# Patient Record
Sex: MALE | Race: WHITE | NOT HISPANIC OR LATINO | Employment: FULL TIME | URBAN - METROPOLITAN AREA
[De-identification: names, ages, dates, MRNs, and addresses within clinical notes are randomized per-mention and may not be internally consistent; named-entity substitution may affect disease eponyms.]

---

## 2018-12-06 ENCOUNTER — APPOINTMENT (EMERGENCY)
Dept: RADIOLOGY | Facility: HOSPITAL | Age: 23
End: 2018-12-06
Payer: COMMERCIAL

## 2018-12-06 ENCOUNTER — HOSPITAL ENCOUNTER (EMERGENCY)
Facility: HOSPITAL | Age: 23
Discharge: HOME | End: 2018-12-06
Attending: EMERGENCY MEDICINE | Admitting: EMERGENCY MEDICINE
Payer: COMMERCIAL

## 2018-12-06 ENCOUNTER — OFFICE VISIT (OUTPATIENT)
Dept: URGENT CARE | Age: 23
End: 2018-12-06
Payer: COMMERCIAL

## 2018-12-06 VITALS
RESPIRATION RATE: 18 BRPM | OXYGEN SATURATION: 100 % | HEART RATE: 64 BPM | SYSTOLIC BLOOD PRESSURE: 133 MMHG | BODY MASS INDEX: 26.22 KG/M2 | WEIGHT: 188 LBS | DIASTOLIC BLOOD PRESSURE: 72 MMHG | TEMPERATURE: 98.7 F

## 2018-12-06 VITALS
TEMPERATURE: 99 F | HEART RATE: 78 BPM | OXYGEN SATURATION: 97 % | SYSTOLIC BLOOD PRESSURE: 139 MMHG | RESPIRATION RATE: 18 BRPM | BODY MASS INDEX: 26.32 KG/M2 | WEIGHT: 188 LBS | HEIGHT: 71 IN | DIASTOLIC BLOOD PRESSURE: 79 MMHG

## 2018-12-06 DIAGNOSIS — K52.9 GASTROENTERITIS: ICD-10-CM

## 2018-12-06 DIAGNOSIS — R11.2 NAUSEA VOMITING AND DIARRHEA: ICD-10-CM

## 2018-12-06 DIAGNOSIS — R10.33 PERIUMBILICAL ABDOMINAL PAIN: Primary | ICD-10-CM

## 2018-12-06 DIAGNOSIS — R19.7 DIARRHEA: Primary | ICD-10-CM

## 2018-12-06 DIAGNOSIS — R50.9 FEVER, UNSPECIFIED FEVER CAUSE: ICD-10-CM

## 2018-12-06 DIAGNOSIS — R10.9 ABDOMINAL PAIN: ICD-10-CM

## 2018-12-06 DIAGNOSIS — R19.7 NAUSEA VOMITING AND DIARRHEA: ICD-10-CM

## 2018-12-06 LAB
ALBUMIN SERPL BCP-MCNC: 3.9 G/DL (ref 3.5–5)
ALP SERPL-CCNC: 75 U/L (ref 46–116)
ALT SERPL W P-5'-P-CCNC: 27 U/L (ref 12–78)
ANION GAP SERPL CALCULATED.3IONS-SCNC: 9 MMOL/L (ref 4–13)
AST SERPL W P-5'-P-CCNC: 17 U/L (ref 5–45)
BASOPHILS # BLD AUTO: 0.02 THOUSANDS/ΜL (ref 0–0.1)
BASOPHILS NFR BLD AUTO: 0 % (ref 0–1)
BILIRUB SERPL-MCNC: 0.24 MG/DL (ref 0.2–1)
BUN SERPL-MCNC: 18 MG/DL (ref 5–25)
CALCIUM SERPL-MCNC: 9.2 MG/DL (ref 8.3–10.1)
CHLORIDE SERPL-SCNC: 106 MMOL/L (ref 100–108)
CO2 SERPL-SCNC: 26 MMOL/L (ref 21–32)
CREAT SERPL-MCNC: 0.9 MG/DL (ref 0.6–1.3)
EOSINOPHIL # BLD AUTO: 0.08 THOUSAND/ΜL (ref 0–0.61)
EOSINOPHIL NFR BLD AUTO: 2 % (ref 0–6)
ERYTHROCYTE [DISTWIDTH] IN BLOOD BY AUTOMATED COUNT: 11.7 % (ref 11.6–15.1)
GFR SERPL CREATININE-BSD FRML MDRD: 121 ML/MIN/1.73SQ M
GLUCOSE SERPL-MCNC: 89 MG/DL (ref 65–140)
HCT VFR BLD AUTO: 46.1 % (ref 36.5–49.3)
HGB BLD-MCNC: 16 G/DL (ref 12–17)
IMM GRANULOCYTES # BLD AUTO: 0.01 THOUSAND/UL (ref 0–0.2)
IMM GRANULOCYTES NFR BLD AUTO: 0 % (ref 0–2)
LIPASE SERPL-CCNC: 105 U/L (ref 73–393)
LYMPHOCYTES # BLD AUTO: 0.96 THOUSANDS/ΜL (ref 0.6–4.47)
LYMPHOCYTES NFR BLD AUTO: 21 % (ref 14–44)
MCH RBC QN AUTO: 30.7 PG (ref 26.8–34.3)
MCHC RBC AUTO-ENTMCNC: 34.7 G/DL (ref 31.4–37.4)
MCV RBC AUTO: 89 FL (ref 82–98)
MONOCYTES # BLD AUTO: 0.58 THOUSAND/ΜL (ref 0.17–1.22)
MONOCYTES NFR BLD AUTO: 13 % (ref 4–12)
NEUTROPHILS # BLD AUTO: 2.84 THOUSANDS/ΜL (ref 1.85–7.62)
NEUTS SEG NFR BLD AUTO: 64 % (ref 43–75)
NRBC BLD AUTO-RTO: 0 /100 WBCS
PLATELET # BLD AUTO: 175 THOUSANDS/UL (ref 149–390)
PMV BLD AUTO: 9.4 FL (ref 8.9–12.7)
POTASSIUM SERPL-SCNC: 3.8 MMOL/L (ref 3.5–5.3)
PROT SERPL-MCNC: 7.8 G/DL (ref 6.4–8.2)
RBC # BLD AUTO: 5.21 MILLION/UL (ref 3.88–5.62)
SODIUM SERPL-SCNC: 141 MMOL/L (ref 136–145)
WBC # BLD AUTO: 4.49 THOUSAND/UL (ref 4.31–10.16)

## 2018-12-06 PROCEDURE — 96361 HYDRATE IV INFUSION ADD-ON: CPT

## 2018-12-06 PROCEDURE — 83690 ASSAY OF LIPASE: CPT | Performed by: EMERGENCY MEDICINE

## 2018-12-06 PROCEDURE — 80053 COMPREHEN METABOLIC PANEL: CPT | Performed by: EMERGENCY MEDICINE

## 2018-12-06 PROCEDURE — 96374 THER/PROPH/DIAG INJ IV PUSH: CPT

## 2018-12-06 PROCEDURE — 74177 CT ABD & PELVIS W/CONTRAST: CPT

## 2018-12-06 PROCEDURE — G0382 LEV 3 HOSP TYPE B ED VISIT: HCPCS | Performed by: FAMILY MEDICINE

## 2018-12-06 PROCEDURE — 36415 COLL VENOUS BLD VENIPUNCTURE: CPT | Performed by: EMERGENCY MEDICINE

## 2018-12-06 PROCEDURE — 99284 EMERGENCY DEPT VISIT MOD MDM: CPT

## 2018-12-06 PROCEDURE — 85025 COMPLETE CBC W/AUTO DIFF WBC: CPT | Performed by: EMERGENCY MEDICINE

## 2018-12-06 RX ORDER — CARBAMAZEPINE 100 MG/5ML
SUSPENSION ORAL 4 TIMES DAILY
COMMUNITY
End: 2021-09-27 | Stop reason: HOSPADM

## 2018-12-06 RX ORDER — DICYCLOMINE HCL 20 MG
20 TABLET ORAL ONCE
Status: COMPLETED | OUTPATIENT
Start: 2018-12-06 | End: 2018-12-06

## 2018-12-06 RX ORDER — DICYCLOMINE HCL 20 MG
20 TABLET ORAL 2 TIMES DAILY
Qty: 10 TABLET | Refills: 0 | Status: SHIPPED | OUTPATIENT
Start: 2018-12-06 | End: 2018-12-13

## 2018-12-06 RX ORDER — ONDANSETRON 2 MG/ML
4 INJECTION INTRAMUSCULAR; INTRAVENOUS ONCE
Status: COMPLETED | OUTPATIENT
Start: 2018-12-06 | End: 2018-12-06

## 2018-12-06 RX ADMIN — SODIUM CHLORIDE 1000 ML: 0.9 INJECTION, SOLUTION INTRAVENOUS at 18:51

## 2018-12-06 RX ADMIN — ONDANSETRON 4 MG: 2 INJECTION INTRAMUSCULAR; INTRAVENOUS at 18:53

## 2018-12-06 RX ADMIN — IOHEXOL 100 ML: 350 INJECTION, SOLUTION INTRAVENOUS at 20:09

## 2018-12-06 RX ADMIN — DICYCLOMINE HYDROCHLORIDE 20 MG: 20 TABLET ORAL at 18:53

## 2018-12-06 NOTE — PATIENT INSTRUCTIONS
Please go to the [facility] Emergency Department now for further evaluation and treatment- hospital address verified with the patient  Patient agreed to go immediately to the ED

## 2018-12-06 NOTE — ED PROVIDER NOTES
History  Chief Complaint   Patient presents with    Abdominal Pain     fever, vomiting, abd pain and diarrhea sent from [facility]  56-year-old male who presents to the ED for evaluation of abdominal pain  Patient's symptoms began 2 days ago with nausea vomiting nonbloody nonbilious, as well as nonbloody diarrhea  He states that he has had loose stools approximately 15 times yesterday and 3 times today  Has taken Advil for some pain relief, but otherwise has not taken anything else for symptoms  States that his abdominal pain is periumbilical in location, constant, intermittently getting worse  On ROS, stating he did have a fever prior to arrival of 100 7  Denies any other relevant past medical history  Patient is concerned because 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 Taking? Lurasidone HCl (LATUDA PO)   Yes Yes   Sig: Take by mouth   carBAMazepine (TEGretol) 100 mg/5 mL suspension   Yes No   Sig: Take by mouth 4 (four) times a day      Facility-Administered Medications: None       Past Medical History:   Diagnosis Date    Bipolar 1 disorder ([facility] [address] )     Depression        History reviewed  No pertinent surgical history  History reviewed  No pertinent family history  I have reviewed and agree with the history as documented  Social History   Substance Use Topics    Smoking status: Never Smoker    Smokeless tobacco: Never Used    Alcohol use No        Review of Systems   Constitutional: Negative for chills, fatigue and fever  HENT: Negative for nosebleeds and sore throat  Eyes: Negative for redness and visual disturbance  Respiratory: Negative for shortness of breath and wheezing  Cardiovascular: Negative for chest pain and palpitations  Gastrointestinal: Positive for abdominal pain, nausea and vomiting   Negative for abdominal distention, anal bleeding, blood in stool, constipation, diarrhea and rectal pain  Endocrine: Negative for polyuria  Genitourinary: Negative for difficulty urinating and testicular pain  Musculoskeletal: Negative for back pain and neck stiffness  Skin: Negative for rash and wound  Neurological: Negative for seizures, speech difficulty and headaches  Psychiatric/Behavioral: Negative for dysphoric mood and hallucinations  All other systems reviewed and are negative  Physical Exam  ED Triage Vitals   Temperature Pulse Respirations Blood Pressure SpO2   12/06/18 1804 12/06/18 1804 12/06/18 1804 12/06/18 1804 12/06/18 1804   98 7 °F (37 1 °C) 67 16 139/65 100 %      Temp Source Heart Rate Source Patient Position - Orthostatic VS BP Location FiO2 (%)   12/06/18 1804 12/06/18 1900 12/06/18 1900 12/06/18 1900 --   Oral Monitor Sitting Right arm       Pain Score       12/06/18 1804       6           Orthostatic Vital Signs  Vitals:    12/06/18 1804 12/06/18 1900 12/06/18 2000 12/06/18 2045   BP: 139/65 135/80 153/85 133/72   Pulse: 67 68 62 64   Patient Position - Orthostatic VS:  Sitting Sitting Sitting       Physical Exam   Constitutional: He is oriented to person, place, and time  He appears well-developed and well-nourished  HENT:   Head: Normocephalic and atraumatic  Right Ear: External ear normal    Left Ear: External ear normal    Mouth/Throat: Oropharynx is clear and moist    Eyes: Pupils are equal, round, and reactive to light  Conjunctivae and EOM are normal    Neck: Normal range of motion  Cardiovascular: Normal rate, regular rhythm, normal heart sounds and intact distal pulses  Pulmonary/Chest: Effort normal and breath sounds normal  He has no wheezes  He exhibits no tenderness  Abdominal: Bowel sounds are normal  There is tenderness  There is guarding  Periumbilical abdominal pain on palpation   Musculoskeletal: Normal range of motion     Neurological: He is alert and oriented to person, place, and time  No cranial nerve deficit or sensory deficit  He exhibits normal muscle tone  Coordination normal    Skin: Skin is warm and dry  No rash noted  Psychiatric: He has a normal mood and affect  Nursing note and vitals reviewed  ED Medications  Medications   sodium chloride 0 9 % bolus 1,000 mL (0 mL Intravenous Stopped 12/6/18 1951)   ondansetron (ZOFRAN) injection 4 mg (4 mg Intravenous Given 12/6/18 1853)   dicyclomine (BENTYL) tablet 20 mg (20 mg Oral Given 12/6/18 1853)   iohexol (OMNIPAQUE) 350 MG/ML injection (MULTI-DOSE) 100 mL (100 mL Intravenous Given 12/6/18 2009)       Diagnostic Studies  Results Reviewed     Procedure Component Value Units Date/Time    Comprehensive metabolic panel [[id number]] Collected:  12/06/18 1851    Lab Status:  Final result Specimen:  Blood from Hand, Right Updated:  12/06/18 1956     Sodium 141 mmol/L      Potassium 3 8 mmol/L      Chloride 106 mmol/L      CO2 26 mmol/L      ANION GAP 9 mmol/L      BUN 18 mg/dL      Creatinine 0 90 mg/dL      Glucose 89 mg/dL      Calcium 9 2 mg/dL      AST 17 U/L      ALT 27 U/L      Alkaline Phosphatase 75 U/L      Total Protein 7 8 g/dL      Albumin 3 9 g/dL      Total Bilirubin 0 24 mg/dL      eGFR 121 ml/min/1 73sq m     Narrative:         National Kidney Disease Education Program recommendations are as follows:  GFR calculation is accurate only with a steady state creatinine  Chronic Kidney disease less than 60 ml/min/1 73 sq  meters  Kidney failure less than 15 ml/min/1 73 sq  meters      Lipase [[id number]]  (Normal) Collected:  12/06/18 1851    Lab Status:  Final result Specimen:  Blood from Hand, Right Updated:  12/06/18 1956     Lipase 105 u/L     CBC and differential [[id number]]  (Abnormal) Collected:  12/06/18 1851    Lab Status:  Final result Specimen:  Blood from Hand, Right Updated:  12/06/18 1939     WBC 4 49 Thousand/uL      RBC 5 21 Million/uL      Hemoglobin 16 0 g/dL      Hematocrit 46 1 %      MCV 89 fL      MCH 30 7 pg      MCHC 34 7 g/dL      RDW 11 7 %      MPV 9 4 fL      Platelets 535 Thousands/uL      nRBC 0 /100 WBCs      Neutrophils Relative 64 %      Immat GRANS % 0 %      Lymphocytes Relative 21 %      Monocytes Relative 13 (H) %      Eosinophils Relative 2 %      Basophils Relative 0 %      Neutrophils Absolute 2 84 Thousands/µL      Immature Grans Absolute 0 01 Thousand/uL      Lymphocytes Absolute 0 96 Thousands/µL      Monocytes Absolute 0 58 Thousand/µL      Eosinophils Absolute 0 08 Thousand/µL      Basophils Absolute 0 02 Thousands/µL                  CT abdomen pelvis w contrast   Final Result by Sobeida Riggins MD (12/06 2018)      There is a small amount of free fluid in the pelvis which is abnormal in a male patient, however, the etiology is uncertain  A normal appendix is visible  No acute localized pathology seen  Workstation performed: [id number]               Procedures  Procedures      Phone Consults  ED Phone Contact    ED Course  ED Course as of Dec 07 0158   Thu Dec 06, 2018   1958 Creatinine: 0 90           MDM  CritCare Time    70-year-old male presents to ED for evaluation of abdominal pain  Given periumbilical abdominal pain, as well as profuse nausea vomiting and diarrhea, will check CBC  CMP lipase, as well as CT abd pelvis given concern for abdominal pathology  Labs unremarkable  CT abd pelvis shows normal appendix  Discharged with bentyl for comfort  The patient was instructed to follow up as documented  Strict return precautions were discussed with the patient and the patient was instructed to return to the emergency department immediately if symptoms worsen  The patient/patient family member acknowledged and were in agreement with plan         Disposition  Final diagnoses:   Diarrhea   Gastroenteritis   Abdominal pain     Time reflects when diagnosis was documented in both MDM as applicable and the Disposition within this note     Time User Action Codes Description Comment    12/6/2018  8:45 PM Shareeyanet Taz Add [R19 7] Diarrhea     12/6/2018  8:46 PM Tad Mcghee Add [K52 9] Gastroenteritis     12/6/2018  8:48 PM Tad Mcghee Add [R10 9] Abdominal pain       ED Disposition     ED Disposition Condition Comment    Discharge  Helen Sp discharge to home/self care  Condition at discharge: Good        Follow-up Information     Follow up With Specialties Details Why Contact Info Additional Information    Claudell Jericho, MD Family Medicine Schedule an appointment as soon as possible for a visit in 3 days For follow up regarding your symptoms [address] [facility] [phone] [facility]       [address] [place] [address] Emergency Department Emergency Medicine Go to If symptoms worsen [address] [facility]  [phone]  ED, [address] [place] [address], [address]          Discharge Medication List as of 12/6/2018  8:48 PM      START taking these medications    Details   dicyclomine (BENTYL) 20 mg tablet Take 1 tablet (20 mg total) by mouth 2 (two) times a day for 7 days, Starting Thu 12/6/2018, Until Thu 12/13/2018, Print         CONTINUE these medications which have NOT CHANGED    Details   carBAMazepine (TEGretol) 100 mg/5 mL suspension Take by mouth 4 (four) times a day, Historical Med      Lurasidone HCl (LATUDA PO) Take by mouth, Historical Med           No discharge procedures on file  ED Provider  Attending physically available and evaluated Helen Snowden I managed the patient along with the ED Attending      Electronically Signed by         Karin Chaparro MD  12/07/18 1515

## 2018-12-06 NOTE — PROGRESS NOTES
[facility] [employer] [facility]        NAME: Janna Rich is a 26 y o  male  : 1995    MRN: [id number]  DATE: 2018  TIME: 5:02 PM    Assessment and Plan   Periumbilical abdominal pain [R10 33]  1  Periumbilical abdominal pain  Transfer to other facility   2  Fever, unspecified fever cause  Transfer to other facility   3  Nausea and vomiting, intractability of vomiting not specified, unspecified vomiting type  Transfer to other facility         Patient Instructions     Please go to the [facility] Emergency Department now for further evaluation and treatment- hospital address verified with the patient- patient given directions  Patient agreed to go immediately to the ED  Chief Complaint     Chief Complaint   Patient presents with    Abdominal Pain     intermittent dull ache , epigastric pain x 2 days,with vomiting , and diarrhea  History of Present Illness       79-year-old male presents with abdominal pain, fevers, vomiting, nausea and diarrhea since Monday  States he has had between 10-15 episodes of loose, watery diarrhea daily  States he has vomited about 5 times daily  States he feels like his diarrhea and vomiting are slightly improved from Monday but still present multiple times throughout the day  Denies any blood in stool or vomit  States his abdominal pain is mostly around his belly button and feels it is worsening  Describes it as a 6/10 that originally went away after bowel movements but now has become more constant  States his fevers got as high as 101 7  He has been taking Tylenol and Motrin  He denies any bad foods, new restaurants, recent travel or antibiotic use  Denies any sick contacts  He feels like he is still holding down some fluids as he is still making urine but he does feel slightly dehydrated  Review of Systems   Review of Systems   Constitutional: Positive for fatigue and fever     HENT: Negative for ear pain, rhinorrhea, sore throat and trouble swallowing  Eyes: Negative for itching and visual disturbance  Respiratory: Negative for cough, shortness of breath and wheezing  Cardiovascular: Negative for chest pain and leg swelling  Gastrointestinal: Positive for abdominal pain, diarrhea, nausea and vomiting  Negative for blood in stool  Musculoskeletal: Negative for back pain, joint swelling, neck pain and neck stiffness  Skin: Negative for rash  Neurological: Negative for dizziness, seizures, weakness, numbness and headaches  All other systems reviewed and are negative  Current Medications       Current Outpatient Prescriptions:     carBAMazepine (TEGretol) 100 mg/5 mL suspension, Take by mouth 4 (four) times a day, Disp: , Rfl:     Current Allergies     Allergies as of 12/06/2018    (No Known Allergies)            The following portions of the patient's history were reviewed and updated as appropriate: allergies, current medications, past family history, past medical history, past social history, past surgical history and problem list      Past Medical History:   Diagnosis Date    Bipolar 1 disorder ([facility] [address] )     Depression        History reviewed  No pertinent surgical history  No family history on file  Medications have been verified  Objective   /79   Pulse 78   Temp 99 °F (37 2 °C) (Temporal)   Resp 18   Ht 5' 11" (1 803 m)   Wt 85 3 kg (188 lb)   SpO2 97%   BMI 26 22 kg/m²        Physical Exam     Physical Exam   Constitutional: He is oriented to person, place, and time  He appears well-developed and well-nourished  Mildly uncomfortable, holding his stomach, appears tired   HENT:   Head: Normocephalic and atraumatic  Mildly dry mucous membranes    Eyes: Pupils are equal, round, and reactive to light  Conjunctivae are normal    Neck: Normal range of motion  Neck supple  Cardiovascular: Normal rate, regular rhythm and normal heart sounds      Pulmonary/Chest: Effort normal and breath sounds normal  He has no wheezes  Abdominal: Soft  Bowel sounds are normal  There is tenderness in the right lower quadrant, periumbilical area and left lower quadrant  There is no rebound  Musculoskeletal: Normal range of motion  Neurological: He is alert and oriented to person, place, and time  Skin: Skin is warm and dry  Psychiatric: He has a normal mood and affect  His behavior is normal    Nursing note and vitals reviewed  Patient with multiple episodes of diarrhea and vomiting since Monday  Patient having fevers  Patient is tender on abdominal exam    Needs to go to the emergency department for further evaluation and treatment

## 2018-12-06 NOTE — ED ATTENDING ATTESTATION
Nael Pride MD, saw and evaluated the patient  I have discussed the patient with the resident/non-physician practitioner and agree with the resident's/non-physician practitioner's findings, Plan of Care, and MDM as documented in the resident's/non-physician practitioner's note, except where noted  All available labs and Radiology studies were reviewed  At this point I agree with the current assessment done in the Emergency Department  I have conducted an independent evaluation of this patient a history and physical is as follows:      Critical Care Time  CritCare Time    Procedures     25 yo male c/o periumbilical abdominal pain for two days, fever up to 100 7, nausea, vomiting x 3 since yesterday  Pt with diarrhea multiple times  Taking advil  Pt went to urgent care sent in for further evaluation  pmh bipolar  Vss, afebrile, lungs cta, rrr, abdomen soft diffuse tenderness  Labs, ivf, ct a/p, zofran, bentyl

## 2018-12-07 NOTE — DISCHARGE INSTRUCTIONS

## 2019-09-13 ENCOUNTER — HOSPITAL ENCOUNTER (EMERGENCY)
Facility: HOSPITAL | Age: 24
Discharge: HOME | End: 2019-09-13
Attending: EMERGENCY MEDICINE | Admitting: EMERGENCY MEDICINE
Payer: COMMERCIAL

## 2019-09-13 ENCOUNTER — TRANSCRIBE ORDERS (OUTPATIENT)
Dept: LAB | Facility: HOSPITAL | Age: 24
End: 2019-09-13

## 2019-09-13 VITALS
HEIGHT: 71 IN | DIASTOLIC BLOOD PRESSURE: 91 MMHG | OXYGEN SATURATION: 100 % | RESPIRATION RATE: 18 BRPM | HEART RATE: 67 BPM | WEIGHT: 200 LBS | TEMPERATURE: 98.4 F | BODY MASS INDEX: 28 KG/M2 | SYSTOLIC BLOOD PRESSURE: 160 MMHG

## 2019-09-13 DIAGNOSIS — R30.0 DYSURIA: ICD-10-CM

## 2019-09-13 DIAGNOSIS — Z20.2 EXPOSURE TO STD: Primary | ICD-10-CM

## 2019-09-13 LAB
BACTERIA UR QL AUTO: ABNORMAL /HPF
BILIRUB UR QL STRIP: NEGATIVE
CLARITY UR: CLEAR
COLOR UR: YELLOW
COLOR, POC: NORMAL
GLUCOSE UR STRIP-MCNC: NEGATIVE MG/DL
HGB UR QL STRIP.AUTO: NEGATIVE
HYALINE CASTS #/AREA URNS LPF: ABNORMAL /LPF
KETONES UR STRIP-MCNC: NEGATIVE MG/DL
LEUKOCYTE ESTERASE UR QL STRIP: ABNORMAL
NITRITE UR QL STRIP: NEGATIVE
NON-SQ EPI CELLS URNS QL MICRO: ABNORMAL /HPF
PH UR STRIP.AUTO: 7 [PH] (ref 4.5–8)
PROT UR STRIP-MCNC: ABNORMAL MG/DL
RBC #/AREA URNS AUTO: ABNORMAL /HPF
SP GR UR STRIP.AUTO: 1.02 (ref 1–1.03)
UROBILINOGEN UR QL STRIP.AUTO: 0.2 E.U./DL
WBC #/AREA URNS AUTO: ABNORMAL /HPF

## 2019-09-13 PROCEDURE — 87086 URINE CULTURE/COLONY COUNT: CPT

## 2019-09-13 PROCEDURE — 96372 THER/PROPH/DIAG INJ SC/IM: CPT

## 2019-09-13 PROCEDURE — 87591 N.GONORRHOEAE DNA AMP PROB: CPT | Performed by: PHYSICIAN ASSISTANT

## 2019-09-13 PROCEDURE — 99283 EMERGENCY DEPT VISIT LOW MDM: CPT | Performed by: PHYSICIAN ASSISTANT

## 2019-09-13 PROCEDURE — 81001 URINALYSIS AUTO W/SCOPE: CPT

## 2019-09-13 PROCEDURE — 87491 CHLMYD TRACH DNA AMP PROBE: CPT | Performed by: PHYSICIAN ASSISTANT

## 2019-09-13 PROCEDURE — 99283 EMERGENCY DEPT VISIT LOW MDM: CPT

## 2019-09-13 RX ORDER — ONDANSETRON 4 MG/1
4 TABLET, ORALLY DISINTEGRATING ORAL ONCE
Status: DISCONTINUED | OUTPATIENT
Start: 2019-09-13 | End: 2019-09-13

## 2019-09-13 RX ORDER — AZITHROMYCIN 250 MG/1
1000 TABLET, FILM COATED ORAL ONCE
Status: COMPLETED | OUTPATIENT
Start: 2019-09-13 | End: 2019-09-13

## 2019-09-13 RX ADMIN — WATER 250 MG: 1 INJECTION INTRAMUSCULAR; INTRAVENOUS; SUBCUTANEOUS at 10:34

## 2019-09-13 RX ADMIN — AZITHROMYCIN 1000 MG: 250 TABLET, FILM COATED ORAL at 10:33

## 2019-09-13 NOTE — ED PROVIDER NOTES
History  Chief Complaint   Patient presents with    Groin Pain     Pt states seen at [facility] a few weeks ago and treated for STD  Pt states pain returned 4 days ago     80-year-old male with history of Chlamydia, diagnosed on 8/8/2019, presents for evaluation of dysuria and penile pain for the past few days  Patient reports that he feels as he did approximately 1 month ago and was diagnosed with chlamydia  He denies any penile discharge but does admit to dysuria  Denies any hematuria, urinary frequency, urgency, abdominal pain, back pain, fever, chills, nausea, vomiting, constipation or diarrhea  Does report having intercourse with the same female partner  Denies getting rechecked after being treated for Chlamydia the 1st time  Did have his partner treated  Denies any rashes or lesions over the groin, or buldges  Prior to Admission Medications   Prescriptions Last Dose Informant Patient Reported? Taking? Lurasidone HCl (LATUDA PO)   Yes No   Sig: Take by mouth   carBAMazepine (TEGretol) 100 mg/5 mL suspension   Yes No   Sig: Take by mouth 4 (four) times a day   dicyclomine (BENTYL) 20 mg tablet   No No   Sig: Take 1 tablet (20 mg total) by mouth 2 (two) times a day for 7 days   lamoTRIgine ER (LAMICTAL XR) 250 MG TB24   Yes No   Sig: Take by mouth   sertraline (ZOLOFT) 25 mg tablet   Yes No   Sig: Take by mouth   ziprasidone (GEODON) 40 mg capsule   Yes No   Sig: Take by mouth      Facility-Administered Medications: None       Past Medical History:   Diagnosis Date    Bipolar 1 disorder ([facility] [address] )     Depression        History reviewed  No pertinent surgical history  History reviewed  No pertinent family history  I have reviewed and agree with the history as documented      Social History     Tobacco Use    Smoking status: Never Smoker    Smokeless tobacco: Never Used   Substance Use Topics    Alcohol use: Yes     Comment: social    Drug use: Yes     Types: Marijuana        Review of Systems   Constitutional: Negative for chills and fever  Gastrointestinal: Negative for abdominal pain, constipation, diarrhea, nausea and vomiting  Genitourinary: Positive for dysuria and penile pain  Negative for discharge, flank pain, frequency, genital sores, hematuria, penile swelling, scrotal swelling and testicular pain  Physical Exam  Physical Exam   Constitutional: He appears well-developed and well-nourished  No distress  Abdominal: Hernia confirmed negative in the right inguinal area and confirmed negative in the left inguinal area  Genitourinary: Testes normal and penis normal  Right testis shows no mass, no swelling and no tenderness  Right testis is descended  Left testis shows no mass, no swelling and no tenderness  Left testis is descended  Circumcised  No phimosis, paraphimosis, hypospadias, penile erythema or penile tenderness  No discharge found  Genitourinary Comments: ED male nurse present during exam  No rashes, lesions, or ulcerations noted  No discharge from penis  No ttp over penis or scrotum  No swelling noted  Skin: He is not diaphoretic  Vitals reviewed        Vital Signs  ED Triage Vitals [09/13/19 0943]   Temperature Pulse Respirations Blood Pressure SpO2   98 4 °F (36 9 °C) 67 18 160/91 100 %      Temp Source Heart Rate Source Patient Position - Orthostatic VS BP Location FiO2 (%)   Oral Monitor Sitting Left arm --      Pain Score       6           Vitals:    09/13/19 0943   BP: 160/91   Pulse: 67   Patient Position - Orthostatic VS: Sitting         Visual Acuity      ED Medications  Medications   azithromycin (ZITHROMAX) tablet 1,000 mg (1,000 mg Oral Given 9/13/19 1033)   cefTRIAXone (ROCEPHIN) 250 mg in sterile water IM only syringe (250 mg Intramuscular Given 9/13/19 1034)       Diagnostic Studies  Results Reviewed     Procedure Component Value Units Date/Time    POCT urinalysis dipstick [[id number]]  (Normal) Resulted:  09/13/19 1103    Lab Status:  Final result Specimen:  Urine Updated:  09/13/19 1103     Color, UA -    Urine Microscopic [[id number]] Collected:  09/13/19 1103    Lab Status:  No result Specimen:  Urine, Other     ED Urine Macroscopic [[id number]]  (Abnormal) Collected:  09/13/19 1103    Lab Status:  Final result Specimen:  Urine Updated:  09/13/19 1102     Color, UA Yellow     Clarity, UA Clear     pH, UA 7 0     Leukocytes, UA Trace     Nitrite, UA Negative     Protein, UA 30 (1+) mg/dl      Glucose, UA Negative mg/dl      Ketones, UA Negative mg/dl      Urobilinogen, UA 0 2 E U /dl      Bilirubin, UA Negative     Blood, UA Negative     Specific Gravity, UA 1 025    Narrative:       CLINITEK RESULT    Chlamydia/GC amplified DNA by PCR [[id number]] Collected:  09/13/19 1032    Lab Status: In process Specimen:  Urine, Other Updated:  09/13/19 1035                 No orders to display              Procedures  Procedures       ED Course                               MDM  Number of Diagnoses or Management Options  Diagnosis management comments: 69-year-old male presents for evaluation of dysuria and penile pain  He is well-appearing, vitals stable  Will treat patient empirically for Chlamydia and gonorrhea  Will check urine possible urinary tract infection  Disposition  Final diagnoses:   Exposure to STD   Dysuria     Time reflects when diagnosis was documented in both MDM as applicable and the Disposition within this note     Time User Action Codes Description Comment    9/13/2019 11:07 AM Melissa Machado Add [Z20 2] Exposure to STD     9/13/2019 11:07 AM Melissa Machado Add [R30 0] Dysuria       ED Disposition     ED Disposition Condition Date/Time Comment    Discharge Stable Fri Sep 13, 2019 11:07 AM Kelly Kelley discharge to home/self care              Follow-up Information     Follow up With Specialties Details Why [facility] Emergency Department Emergency Medicine   [address] [place] [address] [address]  [phone]  ED, [address] [address], [place], [place], [address]          Patient's Medications   Discharge Prescriptions    No medications on file     No discharge procedures on file      ED Provider  Electronically Signed by           Theodore De La Paz PA-C  09/13/19 110

## 2019-09-14 LAB — BACTERIA UR CULT: NORMAL

## 2019-09-16 LAB
C TRACH DNA SPEC QL NAA+PROBE: POSITIVE
N GONORRHOEA DNA SPEC QL NAA+PROBE: NEGATIVE

## 2019-10-09 ENCOUNTER — OFFICE VISIT (OUTPATIENT)
Dept: URGENT CARE | Age: 24
End: 2019-10-09
Payer: COMMERCIAL

## 2019-10-09 VITALS
OXYGEN SATURATION: 98 % | TEMPERATURE: 96.8 F | RESPIRATION RATE: 20 BRPM | HEART RATE: 87 BPM | DIASTOLIC BLOOD PRESSURE: 64 MMHG | SYSTOLIC BLOOD PRESSURE: 122 MMHG

## 2019-10-09 DIAGNOSIS — R30.0 DYSURIA: Primary | ICD-10-CM

## 2019-10-09 LAB
SL AMB  POCT GLUCOSE, UA: NEGATIVE
SL AMB LEUKOCYTE ESTERASE,UA: NEGATIVE
SL AMB POCT BILIRUBIN,UA: NEGATIVE
SL AMB POCT BLOOD,UA: NEGATIVE
SL AMB POCT CLARITY,UA: CLEAR
SL AMB POCT COLOR,UA: YELLOW
SL AMB POCT KETONES,UA: NEGATIVE
SL AMB POCT NITRITE,UA: NEGATIVE
SL AMB POCT PH,UA: 7.5
SL AMB POCT SPECIFIC GRAVITY,UA: 1.01
SL AMB POCT URINE PROTEIN: 7.5
SL AMB POCT UROBILINOGEN: 0.2

## 2019-10-09 PROCEDURE — 87591 N.GONORRHOEAE DNA AMP PROB: CPT | Performed by: PHYSICIAN ASSISTANT

## 2019-10-09 PROCEDURE — 87491 CHLMYD TRACH DNA AMP PROBE: CPT | Performed by: PHYSICIAN ASSISTANT

## 2019-10-09 PROCEDURE — 81002 URINALYSIS NONAUTO W/O SCOPE: CPT | Performed by: PHYSICIAN ASSISTANT

## 2019-10-09 PROCEDURE — 96372 THER/PROPH/DIAG INJ SC/IM: CPT | Performed by: PHYSICIAN ASSISTANT

## 2019-10-09 PROCEDURE — 99203 OFFICE O/P NEW LOW 30 MIN: CPT | Performed by: PHYSICIAN ASSISTANT

## 2019-10-09 PROCEDURE — 87086 URINE CULTURE/COLONY COUNT: CPT | Performed by: PHYSICIAN ASSISTANT

## 2019-10-09 RX ORDER — DOXYCYCLINE HYCLATE 100 MG/1
100 CAPSULE ORAL EVERY 12 HOURS SCHEDULED
Qty: 14 CAPSULE | Refills: 0 | Status: SHIPPED | OUTPATIENT
Start: 2019-10-09 | End: 2019-10-16

## 2019-10-09 RX ORDER — CEFTRIAXONE SODIUM 250 MG/1
250 INJECTION, POWDER, FOR SOLUTION INTRAMUSCULAR; INTRAVENOUS ONCE
Status: COMPLETED | OUTPATIENT
Start: 2019-10-09 | End: 2019-10-09

## 2019-10-09 RX ADMIN — CEFTRIAXONE SODIUM 250 MG: 250 INJECTION, POWDER, FOR SOLUTION INTRAMUSCULAR; INTRAVENOUS at 14:37

## 2019-10-09 NOTE — PROGRESS NOTES
[address]        NAME: Navya Herrera is a 26 y o  male  : 1995    MRN: [id number]  DATE: 2019  TIME: 9:01 AM    Assessment and Plan   Dysuria [R30 0]  1  Dysuria  Chlamydia/GC amplified DNA by PCR    doxycycline hyclate (VIBRAMYCIN) 100 mg capsule    cefTRIAXone (ROCEPHIN) injection 250 mg    Ambulatory referral to Urology    Urine culture    POCT urine dip         Patient Instructions      Take antibiotic as directed until completed  No sexual relations until treatment is completed  Drink plenty of fluids and stay well hydrated  Follow up with PCP in 3-5 days  Proceed to  ER if symptoms worsen  Chief Complaint     Chief Complaint   Patient presents with    Difficulty Urinating     Painful urination, and has the itching and the burning at the urethra  Just treated for STD and finshed the meds and the symptoms returned  after 1 month         History of Present Illness       28-year-old male presents with dysuria  Concerned that he has Chlamydia because it feels like this again  Denies any new sexual partners  Denies any abdominal pain nausea vomiting or diarrhea  No chest pain shortness of breath  Denies any fevers or chills  Denies any ear pain sore throats  Difficulty Urinating    This is a new problem  The problem occurs every urination  The problem has been waxing and waning  The quality of the pain is described as burning  The pain is moderate  There has been no fever  He is not sexually active  There is no history of pyelonephritis  Associated symptoms include frequency and urgency  Pertinent negatives include no discharge, hesitancy, nausea or vomiting  He has tried nothing for the symptoms  Review of Systems   Review of Systems   Constitutional: Negative  HENT: Negative  Eyes: Negative  Respiratory: Negative  Cardiovascular: Negative  Gastrointestinal: Negative  Negative for nausea and vomiting     Genitourinary: Positive for dysuria, frequency and urgency  Negative for hesitancy  Musculoskeletal: Negative  Skin: Negative  Neurological: Negative  Current Medications       Current Outpatient Medications:     carBAMazepine (TEGretol) 100 mg/5 mL suspension, Take by mouth 4 (four) times a day, Disp: , Rfl:     dicyclomine (BENTYL) 20 mg tablet, Take 1 tablet (20 mg total) by mouth 2 (two) times a day for 7 days, Disp: 10 tablet, Rfl: 0    doxycycline hyclate (VIBRAMYCIN) 100 mg capsule, Take 1 capsule (100 mg total) by mouth every 12 (twelve) hours for 7 days, Disp: 14 capsule, Rfl: 0    lamoTRIgine ER (LAMICTAL XR) 250 MG TB24, Take by mouth, Disp: , Rfl:     Lurasidone HCl (LATUDA PO), Take by mouth, Disp: , Rfl:     sertraline (ZOLOFT) 25 mg tablet, Take by mouth, Disp: , Rfl:     ziprasidone (GEODON) 40 mg capsule, Take by mouth, Disp: , Rfl:     Current Allergies     Allergies as of 10/09/2019    (No Known Allergies)            The following portions of the patient's history were reviewed and updated as appropriate: allergies, current medications, past family history, past medical history, past social history, past surgical history and problem list      Past Medical History:   Diagnosis Date    Bipolar 1 disorder ([facility] [address] )     Depression        No past surgical history on file  No family history on file  Medications have been verified  Objective   /64   Pulse 87   Temp (!) 96 8 °F (36 °C) (Tympanic)   Resp 20   SpO2 98%        Physical Exam     Physical Exam   Constitutional: He is oriented to person, place, and time  He appears well-developed and well-nourished  No distress  HENT:   Head: Normocephalic and atraumatic  Right Ear: External ear normal    Left Ear: External ear normal    Nose: Nose normal    Mouth/Throat: Oropharynx is clear and moist  No oropharyngeal exudate  Eyes: Conjunctivae and EOM are normal  Right eye exhibits no discharge  Left eye exhibits no discharge     Neck: Normal range of motion  Neck supple  Cardiovascular: Normal rate, regular rhythm, normal heart sounds and intact distal pulses  No murmur heard  Pulmonary/Chest: Effort normal and breath sounds normal  No respiratory distress  He has no wheezes  He has no rales  Abdominal: Soft  Bowel sounds are normal  There is no tenderness  Musculoskeletal: Normal range of motion  Lymphadenopathy:     He has no cervical adenopathy  Neurological: He is alert and oriented to person, place, and time  Skin: Skin is warm and dry  Psychiatric: He has a normal mood and affect  Nursing note and vitals reviewed

## 2019-10-09 NOTE — PATIENT INSTRUCTIONS
Take antibiotic as directed until completed  No sexual relations until treatment is completed  Drink plenty of fluids and stay well hydrated  Follow up with PCP in 3-5 days  Proceed to  ER if symptoms worsen    Dysuria   WHAT YOU NEED TO KNOW:   Dysuria is difficulty urinating, or pain, burning, or discomfort with urination  Dysuria is usually a symptom of another problem  DISCHARGE INSTRUCTIONS:   Return to the emergency department if:   · You have severe back, side, or abdominal pain  · You have fever and shaking chills  · You vomit several times in a row  Contact your healthcare provider if:   · Your symptoms do not go away, even after treatment  · You have questions or concerns about your condition or care  Medicines:   · Medicines  may be given to help treat a bacterial infection or help decrease bladder spasms  · Take your medicine as directed  Contact your healthcare provider if you think your medicine is not helping or if you have side effects  Tell him of her if you are allergic to any medicine  Keep a list of the medicines, vitamins, and herbs you take  Include the amounts, and when and why you take them  Bring the list or the pill bottles to follow-up visits  Carry your medicine list with you in case of an emergency  Follow up with your healthcare provider as directed: Your healthcare provider may also refer you to a urologist or nephrologist to have additional testing  Write down your questions so you remember to ask them during your visits  Manage your dysuria:   · Drink more liquids  Liquids help flush out bacteria that may be causing an infection  Ask your healthcare provider how much liquid to drink each day and which liquids are best for you  · Take sitz baths as directed  Fill a bathtub with 4 to 6 inches of warm water  You may also use a sitz bath pan that fits over a toilet  Sit in the sitz bath for 20 minutes  Do this 2 to 3 times a day, or as directed   The warm water can help decrease pain and swelling  © 2017 [employer]  Information is for End User's use only and may not be sold, redistributed or otherwise used for commercial purposes  All illustrations and images included in CareNotes® are the copyrighted property of A D [employer] M [employer]  or [employer]eng Waldron  The above information is an  only  It is not intended as medical advice for individual conditions or treatments  Talk to your doctor, nurse or pharmacist before following any medical regimen to see if it is safe and effective for you  Sexually Transmitted Diseases   WHAT YOU NEED TO KNOW:   A sexually transmitted disease (STD), is also called a sexually transmitted infection (STI)  An STD is an infection caused by bacteria or a virus  STDs are spread by oral, genital, or anal sex  Some examples of STDs are HIV, chlamydia, syphilis, and gonorrhea  DISCHARGE INSTRUCTIONS:   Return to the emergency department if:   · You have genital swelling or pain, or unusual bleeding  · You have joint pain, rash, swollen lymph nodes, or night sweats  · You have severe abdominal pain  Contact your healthcare provider if:   · You have a fever  · Your symptoms do not go away or they get worse, even after treatment  · You have bleeding or pain during sex  · You have questions or concerns about your condition or care  Medicines:   · Medicines  help treat the infection  · Take your medicine as directed  Contact your healthcare provider if you think your medicine is not helping or if you have side effects  Tell him of her if you are allergic to any medicine  Keep a list of the medicines, vitamins, and herbs you take  Include the amounts, and when and why you take them  Bring the list or the pill bottles to follow-up visits  Carry your medicine list with you in case of an emergency    Prevent the spread of an STD:  Ask your healthcare provider for more information about the following safe sex practices:  · Use condoms  Use a latex condom if you have oral, genital, or anal sex  Use a new condom each time  Use a polyurethane condom if you are allergic to latex  · Do not douche  Douching upsets the normal balance of bacteria are found in your vagina  It does not prevent or clear up vaginal infections  · Do not have sex with someone who has an STD  This includes oral and anal sex  · Limit sexual partners  Have sex with one person who is not having sex with anyone else  · Do not have sex during treatment  Do not have sex while you or your partners are being treated for an STD  · Get screening tests regularly  if you are sexually active  · Get vaccinated  Vaccines may help to prevent your risk of some STDs  Ask your healthcare provider for more information about vaccines for STDs  Follow up with your healthcare provider as directed:  Write down your questions so you remember to ask them during your visits  © 2017 [employer] Information is for End User's use only and may not be sold, redistributed or otherwise used for commercial purposes  All illustrations and images included in CareNotes® are the copyrighted property of [employer]  or Deng Waldron  The above information is an  only  It is not intended as medical advice for individual conditions or treatments  Talk to your doctor, nurse or pharmacist before following any medical regimen to see if it is safe and effective for you

## 2019-10-10 LAB — BACTERIA UR CULT: NORMAL

## 2019-10-11 ENCOUNTER — TELEPHONE (OUTPATIENT)
Dept: URGENT CARE | Age: 24
End: 2019-10-11

## 2019-10-11 LAB
C TRACH DNA SPEC QL NAA+PROBE: POSITIVE
N GONORRHOEA DNA SPEC QL NAA+PROBE: NEGATIVE

## 2019-10-11 NOTE — TELEPHONE ENCOUNTER
Patient came back positive for chlamydia  Treated with doxycycline  Was called to inform patient about positive culture and to have patient continue to follow up with urologist   Also going to check to see if he has been taking antibiotics as directed

## 2019-10-14 ENCOUNTER — DOCUMENTATION (OUTPATIENT)
Dept: URGENT CARE | Age: 24
End: 2019-10-14

## 2019-10-14 ENCOUNTER — TELEPHONE (OUTPATIENT)
Dept: URGENT CARE | Facility: MEDICAL CENTER | Age: 24
End: 2019-10-14

## 2019-10-14 NOTE — PROGRESS NOTES
Received call from pt with regards to his GC/Chlamydia results  Results retrieved, NANETTE Wallace relayed to patient that he was positive for Gonorrhea and to continue antibiotic as prescribed    Aura Stark RN

## 2019-10-14 NOTE — TELEPHONE ENCOUNTER
Called patient and left another voicemail to call back for culture results  Will send letter to house for patient to call back for results

## 2019-10-14 NOTE — TELEPHONE ENCOUNTER
Patient called over to [facility] now all for culture results  Patient and the painting up while waiting for provider to answer questions    Called and left message for him to call back over to [facility] to discuss culture results

## 2019-10-22 ENCOUNTER — TELEPHONE (OUTPATIENT)
Dept: UROLOGY | Facility: MEDICAL CENTER | Age: 24
End: 2019-10-22

## 2019-10-24 ENCOUNTER — SEXUAL HEALTH (OUTPATIENT)
Dept: SURGERY | Facility: CLINIC | Age: 24
End: 2019-10-24

## 2019-10-24 DIAGNOSIS — A59.9 TRICHIMONIASIS: ICD-10-CM

## 2019-10-24 DIAGNOSIS — Z11.3 SCREENING FOR STD (SEXUALLY TRANSMITTED DISEASE): Primary | ICD-10-CM

## 2019-10-24 NOTE — PROGRESS NOTES
As per order  Pt received 2G x1 dose of Metronidazole orally in clinic  Medication information sheet provided and pt  Took home
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
## 2019-10-31 ENCOUNTER — SEXUAL HEALTH (OUTPATIENT)
Dept: SURGERY | Facility: CLINIC | Age: 24
End: 2019-10-31

## 2019-10-31 DIAGNOSIS — Z20.2 EXPOSURE TO SEXUALLY TRANSMITTED DISEASE (STD): Primary | ICD-10-CM

## 2019-10-31 NOTE — PROGRESS NOTES
Pt into clinic for HIV, RPR, GC/CT results  All results negative  Reviewed and copies given to pt  Pt was treated last week for trich  Pt still experiencing same symptoms  Pt to discuss issues with CRNP

## 2019-10-31 NOTE — PROGRESS NOTES
Patient is being seen in the STD clinic today for review of results when testing of last week and patient had questions  CRNP spoke [facility] patient reviewed results with patient  Reviewed symptomatology from last visit  Patient denies any itching or burning since last visit and admits to having feeling a pinch during ejaculation that last for about half a second and that occurred 3 or 4 times this past week  The patient reports the pinch is felt about 1/2 inch from the opening of the urethra  Patient was worried that he had infections still going on  CRNP offered emotional support and gave reassurance the patient did not require any further antibiotics at this time  Patient admitted maybe he's  just anxious since he had been having the symptoms for several weeks and had not responded to previous by antibiotic treatment for chlamydia X 3  Patient encouraged for physical exam and CRNP examined patient's penis and did not notice any penile discharge or redness at the opening of the urethra compared to last week's exam  No adenopathy noted in bilateral groin area

## 2019-11-01 ENCOUNTER — OFFICE VISIT (OUTPATIENT)
Dept: FAMILY MEDICINE CLINIC | Facility: CLINIC | Age: 24
End: 2019-11-01
Payer: COMMERCIAL

## 2019-11-01 VITALS
TEMPERATURE: 97.9 F | OXYGEN SATURATION: 98 % | BODY MASS INDEX: 27.47 KG/M2 | DIASTOLIC BLOOD PRESSURE: 76 MMHG | HEIGHT: 72 IN | RESPIRATION RATE: 16 BRPM | WEIGHT: 202.8 LBS | HEART RATE: 60 BPM | SYSTOLIC BLOOD PRESSURE: 120 MMHG

## 2019-11-01 DIAGNOSIS — F10.10 DYSFUNCTIONAL ALCOHOL USE: ICD-10-CM

## 2019-11-01 DIAGNOSIS — F31.9 BIPOLAR 1 DISORDER (HCC): ICD-10-CM

## 2019-11-01 DIAGNOSIS — F32.A DEPRESSION, UNSPECIFIED DEPRESSION TYPE: ICD-10-CM

## 2019-11-01 DIAGNOSIS — Z00.00 ENCOUNTER FOR ROUTINE ADULT HEALTH EXAMINATION WITHOUT ABNORMAL FINDINGS: Primary | ICD-10-CM

## 2019-11-01 PROBLEM — R19.7 DIARRHEA: Status: ACTIVE | Noted: 2018-12-10

## 2019-11-01 PROBLEM — R10.9 ABDOMINAL PAIN: Status: ACTIVE | Noted: 2018-12-10

## 2019-11-01 PROBLEM — F39 MOOD DISORDER (HCC): Status: ACTIVE | Noted: 2019-11-01

## 2019-11-01 PROCEDURE — 99385 PREV VISIT NEW AGE 18-39: CPT | Performed by: FAMILY MEDICINE

## 2019-11-01 RX ORDER — DICYCLOMINE HCL 20 MG
20 TABLET ORAL EVERY 6 HOURS
COMMUNITY
Start: 2018-12-10 | End: 2021-09-27 | Stop reason: HOSPADM

## 2019-11-01 RX ORDER — PRAZOSIN HYDROCHLORIDE 2 MG/1
CAPSULE ORAL
Refills: 1 | COMMUNITY
Start: 2019-08-22 | End: 2021-09-27 | Stop reason: HOSPADM

## 2019-11-01 RX ORDER — DEXTROAMPHETAMINE SACCHARATE, AMPHETAMINE ASPARTATE MONOHYDRATE, DEXTROAMPHETAMINE SULFATE AND AMPHETAMINE SULFATE 7.5; 7.5; 7.5; 7.5 MG/1; MG/1; MG/1; MG/1
CAPSULE, EXTENDED RELEASE ORAL
Refills: 0 | COMMUNITY
Start: 2019-08-22

## 2019-11-01 RX ORDER — RISPERIDONE 1 MG/1
TABLET, FILM COATED ORAL
Refills: 1 | COMMUNITY
Start: 2019-08-22 | End: 2021-09-27 | Stop reason: HOSPADM

## 2019-11-01 RX ORDER — SERTRALINE HYDROCHLORIDE 100 MG/1
100 TABLET, FILM COATED ORAL DAILY
COMMUNITY
End: 2021-09-27 | Stop reason: HOSPADM

## 2019-11-01 RX ORDER — HYDROCORTISONE ACETATE 25 MG/1
SUPPOSITORY RECTAL
Refills: 0 | COMMUNITY
Start: 2019-09-01 | End: 2021-09-27 | Stop reason: HOSPADM

## 2019-11-01 RX ORDER — IBUPROFEN 800 MG/1
TABLET ORAL
Refills: 0 | COMMUNITY
Start: 2019-08-02 | End: 2021-09-27 | Stop reason: HOSPADM

## 2019-11-01 NOTE — PROGRESS NOTES
Assessment/Plan:    Encounter for routine adult health examination without abnormal findings  It was discussed about immunizations, diet, exercise and safety measures  Dysfunctional alcohol use  Was discussed with patient about cutting down on alcohol and seeking inpatient or outpatient rehab  Patient refuses  Bipolar 1 disorder ([facility] )  He denies any suicidal or homicidal thoughts  He refuses to take medications  He refuses to see psychiatrist but he agreed to see therapist     Mood disorder [facility])  He refuses medications  He was told he needs to see psychiatrist but he refuses  He agreed to see psychologist        Diagnoses and all orders for this visit:    Encounter for routine adult health examination without abnormal findings    Dysfunctional alcohol use    Depression, unspecified depression type    Bipolar 1 disorder ([facility] )    BMI 27 0-27 9,adult    Other orders  -     amphetamine-dextroamphetamine (ADDERALL XR) 30 MG 24 hr capsule  -     hydrocortisone (ANUSOL-HC) 25 mg suppository; INSERT 1 SUPPOSITORY INTO THE RECTUM 2 TIMES A DAY FOR 10 DAYS  -     ibuprofen (MOTRIN) 800 mg tablet; PLEASE SEE ATTACHED FOR DETAILED DIRECTIONS  -     prazosin (MINIPRESS) 2 mg capsule  -     risperiDONE (RisperDAL) 1 mg tablet  -     dicyclomine (BENTYL) 20 mg tablet; Take 20 mg by mouth every 6 (six) hours  -     lurasidone (LATUDA) 40 mg tablet; Take 40 mg by mouth  -     sertraline (ZOLOFT) 100 mg tablet; Take 100 mg by mouth daily          Subjective:      Patient ID: Sal Wen is a 26 y o  male  N Y  Is a 26year old male here today with a PMH of bipolar and depression presenting to establish care  Patient stated he feels overall healthy and has no complaints today  Patient is not interested in having his flu shot today  Patient does not follow with a psychiatrist currently, stated the last time he saw the psychiatrist was 3 months ago  Patient stated he feels his mood is currently stable   Patient stated about a month ago he was self harming by cutting himself with a razor blade, hasn't done it in about a month  Patient denied any current suicidal ideation or self harm ideation  Patient stated he used to follow with a counselor, hasn't seen one in "awhile"  Patient is a   Patient stated he tries to eat healthy and exercises regularly  Patient stated he used to smoke cigarettes, stated he now smokes 1 cigarette a week  Patient stated he drinks alcohol 2-3 times a week about 7-8 beers, stated he slowed down a lot  Patient denied any illicit drug use  The following portions of the patient's history were reviewed and updated as appropriate: allergies, current medications, past family history, past medical history, past social history, past surgical history and problem list     Review of Systems   Constitutional: Negative for chills and fever  HENT: Negative for congestion, rhinorrhea and sore throat  Eyes: Negative for visual disturbance  Respiratory: Negative for cough and shortness of breath  Cardiovascular: Negative for chest pain and palpitations  Gastrointestinal: Negative for abdominal pain, constipation, diarrhea, nausea and vomiting  Endocrine: Negative for polydipsia and polyuria  Genitourinary: Negative for dysuria and hematuria  Musculoskeletal: Negative for arthralgias and myalgias  Skin: Negative for rash  Neurological: Negative for dizziness, syncope, light-headedness and headaches  Psychiatric/Behavioral: Negative for self-injury, sleep disturbance and suicidal ideas  The patient is not nervous/anxious  Objective:      /76 (BP Location: Left arm, Patient Position: Sitting, Cuff Size: Large)   Pulse 60   Temp 97 9 °F (36 6 °C) (Tympanic)   Resp 16   Ht 5' 11 5" (1 816 m)   Wt 92 kg (202 lb 12 8 oz)   SpO2 98%   BMI 27 89 kg/m²          Physical Exam   Constitutional: He is oriented to person, place, and time   He appears well-developed and well-nourished  No distress  HENT:   Head: Normocephalic and atraumatic  Right Ear: External ear normal    Left Ear: External ear normal    Nose: Nose normal    Mouth/Throat: Oropharynx is clear and moist  No oropharyngeal exudate  Eyes: Pupils are equal, round, and reactive to light  Conjunctivae and EOM are normal    Neck: Normal range of motion  Neck supple  Cardiovascular: Normal rate, regular rhythm and normal heart sounds  No murmur heard  Pulmonary/Chest: Effort normal and breath sounds normal    Abdominal: Soft  Bowel sounds are normal  He exhibits no distension  There is no tenderness  Musculoskeletal: Normal range of motion  He exhibits no edema  Neurological: He is alert and oriented to person, place, and time  Skin: Skin is warm and dry  Nursing note and vitals reviewed  BMI Counseling: Body mass index is 27 89 kg/m²  The BMI is above normal  Nutrition recommendations include reducing portion sizes, decreasing overall calorie intake and 3-5 servings of fruits/vegetables daily  Exercise recommendations include moderate aerobic physical activity for 150 minutes/week

## 2019-11-01 NOTE — ASSESSMENT & PLAN NOTE
He refuses medications  He was told he needs to see psychiatrist but he refuses    He agreed to see psychologist

## 2019-11-01 NOTE — ASSESSMENT & PLAN NOTE
Was discussed with patient about cutting down on alcohol and seeking inpatient or outpatient rehab  Patient refuses

## 2019-11-01 NOTE — ASSESSMENT & PLAN NOTE
He denies any suicidal or homicidal thoughts  He refuses to take medications    He refuses to see psychiatrist but he agreed to see therapist

## 2019-11-01 NOTE — PATIENT INSTRUCTIONS
Calorie Counting Diet   WHAT YOU NEED TO KNOW:   What is a calorie counting diet? It is a meal plan based on counting calories each day to reach a healthy body weight  You will need to eat fewer calories if you are trying to lose weight  Weight loss may decrease your risk for certain health problems or improve your health if you have health problems  Some of these health problems include heart disease, high blood pressure, and diabetes  What foods should I avoid? Your dietitian will tell you if you need to avoid certain foods based on your body weight and health condition  You may need to avoid high-fat foods if you are at risk for or have heart disease  You may need to eat fewer foods from the breads and starches food group if you have diabetes  How many calories are in foods? The following is a list of foods and drinks with the approximate number of calories in each  Check the food label to find the exact number of calories  A dietitian can tell you how many calories you should have from each food group each day    · Carbohydrate:      ¨ ½ of a 3-inch bagel, 1 slice of bread, or ½ of a hamburger bun or hot dog bun (80)    ¨ 1 (8-inch) flour tortilla or ½ cup of cooked rice (100)    ¨ 1 (6-inch) corn tortilla (80)    ¨ 1 (6-inch) pancake or 1 cup of bran flakes cereal (110)    ¨ ½ cup of cooked cereal (80)    ¨ ½ cup of cooked pasta (85)    ¨ 1 ounce of pretzels (100)    ¨ 3 cups of air-popped popcorn without butter or oil (80)    · Dairy:      ¨ 1 cup of skim or 1% milk (90)    ¨ 1 cup of 2% milk (120)    ¨ 1 cup of whole milk (160)    ¨ 1 cup of 2% chocolate milk (220)    ¨ 1 ounce of low-fat cheese with 3 grams of fat per ounce (70)    ¨ 1 ounce of cheddar cheese (114)    ¨ ½ cup of 1% fat cottage cheese (80)    ¨ 1 cup of plain or sugar-free, fat-free yogurt (90)    · Protein foods:      ¨ 3 ounces of fish (not breaded or fried) (95)    ¨ 3 ounces of breaded, fried fish (195)    ¨ ¾ cup of tuna canned in water (105)    ¨ 3 ounces of chicken breast without skin (105)    ¨ 1 fried chicken breast with skin (350)    ¨ ¼ cup of fat free egg substitute (40)    ¨ 1 large egg (75)    ¨ 3 ounces of lean beef or pork (165)    ¨ 3 ounces of fried pork chop or ham (185)    ¨ ½ cup of cooked dried beans, such as kidney, flores, lentils, or navy (115)    ¨ 3 ounces of bologna or lunch meat (225)    ¨ 2 links of breakfast sausage (140)    · Vegetables:      ¨ ½ cup of sliced mushrooms (10)    ¨ 1 cup of salad greens, such as lettuce, spinach, or guerda (15)    ¨ ½ cup of steamed asparagus (20)    ¨ ½ cup of cooked summer squash, zucchini squash, or green or wax beans (25)    ¨ 1 cup of broccoli or cauliflower florets, or 1 medium tomato (25)    ¨ 1 large raw carrot or ½ cup of cooked carrots (40)    ¨ ? of a medium cucumber or 1 stalk of celery (5)    ¨ 1 small baked potato (160)    ¨ 1 cup of breaded, fried vegetables (230)    · Fruit:      ¨ 1 (6-inch) banana (55)     ¨ ½ of a 4-inch grapefruit (55)    ¨ 15 grapes (60)    ¨ 1 medium orange or apple (70)    ¨ 1 large peach (65)    ¨ 1 cup of fresh pineapple chunks (75)    ¨ 1 cup of melon cubes (50)    ¨ 1¼ cups of whole strawberries (45)    ¨ ½ cup of fruit canned in juice (55)    ¨ ½ cup of fruit canned in heavy syrup (110)    ¨ ?  cup of raisins (130)    ¨ ½ cup of unsweetened fruit juice (60)    ¨ ½ cup of grape, cranberry, or prune juice (90)    · Fat:      ¨ 10 peanuts or 2 teaspoons of peanut butter (55)    ¨ 2 tablespoons of avocado or 1 tablespoon of regular salad dressing (45)    ¨ 2 slices of baron (90)    ¨ 1 teaspoon of oil, such as safflower, canola, corn, or olive oil (45)    ¨ 2 teaspoons of low-fat margarine, or 1 tablespoon of low-fat mayonnaise (50)    ¨ 1 teaspoon of regular margarine (40)    ¨ 1 tablespoon of regular mayonnaise (135)    ¨ 1 tablespoon of cream cheese or 2 tablespoons of low-fat cream cheese (45)    ¨ 2 tablespoons of vegetable shortening (215)    · Dessert and sweets:      ¨ 8 animal crackers or 5 vanilla wafers (80)    ¨ 1 frozen fruit juice bar (80)    ¨ ½ cup of ice milk or low-fat frozen yogurt (90)    ¨ ½ cup of sherbet or sorbet (125)    ¨ ½ cup of sugar-free pudding or custard (60)    ¨ ½ cup of ice cream (140)    ¨ ½ cup of pudding or custard (175)    ¨ 1 (2-inch) square chocolate brownie (185)    · Combination foods:      ¨ Bean burrito made with an 8-inch tortilla, without cheese (275)    ¨ Chicken breast sandwich with lettuce and tomato (325)    ¨ 1 cup of chicken noodle soup (60)    ¨ 1 beef taco (175)    ¨ Regular hamburger with lettuce and tomato (310)    ¨ Regular cheeseburger with lettuce and tomato (410)     ¨ ¼ of a 12-inch cheese pizza (280)    ¨ Fried fish sandwich with lettuce and tomato (425)    ¨ Hot dog and bun (275)    ¨ 1½ cups of macaroni and cheese (310)    ¨ Taco salad with a fried tortilla shell (870)    · Low-calorie foods:      ¨ 1 tablespoon of ketchup or 1 tablespoon of fat free sour cream (15)    ¨ 1 teaspoon of mustard (5)    ¨ ¼ cup of salsa (20)    ¨ 1 large dill pickle (15)    ¨ 1 tablespoon of fat free salad dressing (10)    ¨ 2 teaspoons of low-sugar, light jam or jelly, or 1 tablespoon of sugar-free syrup (15)    ¨ 1 sugar-free popsicle (15)    ¨ 1 cup of club soda, seltzer water, or diet soda (0)  CARE AGREEMENT:   You have the right to help plan your care  Discuss treatment options with your caregivers to decide what care you want to receive  You always have the right to refuse treatment  The above information is an  only  It is not intended as medical advice for individual conditions or treatments  Talk to your doctor, nurse or pharmacist before following any medical regimen to see if it is safe and effective for you  © 2017 [employer] Information is for End User's use only and may not be sold, redistributed or otherwise used for commercial purposes   All illustrations and images included in CareNotes® are the copyrighted property of A D A M , Inc  or Deng Waldron

## 2021-02-09 ENCOUNTER — TELEPHONE (OUTPATIENT)
Dept: FAMILY MEDICINE CLINIC | Facility: CLINIC | Age: 26
End: 2021-02-09

## 2021-07-17 ENCOUNTER — APPOINTMENT (OUTPATIENT)
Dept: LAB | Facility: CLINIC | Age: 26
End: 2021-07-17
Payer: COMMERCIAL

## 2021-07-17 DIAGNOSIS — E55.9 VITAMIN D DEFICIENCY: ICD-10-CM

## 2021-07-17 DIAGNOSIS — R53.83 OTHER FATIGUE: ICD-10-CM

## 2021-07-17 LAB
25(OH)D3 SERPL-MCNC: 22.8 NG/ML (ref 30–100)
ALBUMIN SERPL BCP-MCNC: 4 G/DL (ref 3.5–5)
ALP SERPL-CCNC: 57 U/L (ref 46–116)
ALT SERPL W P-5'-P-CCNC: 30 U/L (ref 12–78)
ANION GAP SERPL CALCULATED.3IONS-SCNC: 4 MMOL/L (ref 4–13)
AST SERPL W P-5'-P-CCNC: 15 U/L (ref 5–45)
BASOPHILS # BLD AUTO: 0.03 THOUSANDS/ΜL (ref 0–0.1)
BASOPHILS NFR BLD AUTO: 1 % (ref 0–1)
BILIRUB SERPL-MCNC: 0.55 MG/DL (ref 0.2–1)
BUN SERPL-MCNC: 18 MG/DL (ref 5–25)
CALCIUM SERPL-MCNC: 9.4 MG/DL (ref 8.3–10.1)
CHLORIDE SERPL-SCNC: 107 MMOL/L (ref 100–108)
CO2 SERPL-SCNC: 25 MMOL/L (ref 21–32)
CREAT SERPL-MCNC: 0.84 MG/DL (ref 0.6–1.3)
EOSINOPHIL # BLD AUTO: 0.11 THOUSAND/ΜL (ref 0–0.61)
EOSINOPHIL NFR BLD AUTO: 2 % (ref 0–6)
ERYTHROCYTE [DISTWIDTH] IN BLOOD BY AUTOMATED COUNT: 11.9 % (ref 11.6–15.1)
GFR SERPL CREATININE-BSD FRML MDRD: 123 ML/MIN/1.73SQ M
GLUCOSE P FAST SERPL-MCNC: 90 MG/DL (ref 65–99)
HCT VFR BLD AUTO: 46.1 % (ref 36.5–49.3)
HGB BLD-MCNC: 15.4 G/DL (ref 12–17)
IMM GRANULOCYTES # BLD AUTO: 0.02 THOUSAND/UL (ref 0–0.2)
IMM GRANULOCYTES NFR BLD AUTO: 0 % (ref 0–2)
LYMPHOCYTES # BLD AUTO: 1.81 THOUSANDS/ΜL (ref 0.6–4.47)
LYMPHOCYTES NFR BLD AUTO: 33 % (ref 14–44)
MCH RBC QN AUTO: 30 PG (ref 26.8–34.3)
MCHC RBC AUTO-ENTMCNC: 33.4 G/DL (ref 31.4–37.4)
MCV RBC AUTO: 90 FL (ref 82–98)
MONOCYTES # BLD AUTO: 0.46 THOUSAND/ΜL (ref 0.17–1.22)
MONOCYTES NFR BLD AUTO: 8 % (ref 4–12)
NEUTROPHILS # BLD AUTO: 3.05 THOUSANDS/ΜL (ref 1.85–7.62)
NEUTS SEG NFR BLD AUTO: 56 % (ref 43–75)
NRBC BLD AUTO-RTO: 0 /100 WBCS
PLATELET # BLD AUTO: 224 THOUSANDS/UL (ref 149–390)
PMV BLD AUTO: 9.7 FL (ref 8.9–12.7)
POTASSIUM SERPL-SCNC: 4.5 MMOL/L (ref 3.5–5.3)
PROT SERPL-MCNC: 7.7 G/DL (ref 6.4–8.2)
RBC # BLD AUTO: 5.14 MILLION/UL (ref 3.88–5.62)
SODIUM SERPL-SCNC: 136 MMOL/L (ref 136–145)
T4 FREE SERPL-MCNC: 0.82 NG/DL (ref 0.76–1.46)
TSH SERPL DL<=0.05 MIU/L-ACNC: 0.68 UIU/ML (ref 0.36–3.74)
WBC # BLD AUTO: 5.48 THOUSAND/UL (ref 4.31–10.16)

## 2021-07-17 PROCEDURE — 84443 ASSAY THYROID STIM HORMONE: CPT

## 2021-07-17 PROCEDURE — 84439 ASSAY OF FREE THYROXINE: CPT

## 2021-07-17 PROCEDURE — 80053 COMPREHEN METABOLIC PANEL: CPT

## 2021-07-17 PROCEDURE — 85025 COMPLETE CBC W/AUTO DIFF WBC: CPT

## 2021-07-17 PROCEDURE — 82306 VITAMIN D 25 HYDROXY: CPT

## 2021-07-17 PROCEDURE — 36415 COLL VENOUS BLD VENIPUNCTURE: CPT

## 2021-09-21 ENCOUNTER — HOSPITAL ENCOUNTER (EMERGENCY)
Facility: HOSPITAL | Age: 26
End: 2021-09-23
Attending: EMERGENCY MEDICINE
Payer: COMMERCIAL

## 2021-09-21 DIAGNOSIS — R45.851 SUICIDAL IDEATIONS: Primary | ICD-10-CM

## 2021-09-21 DIAGNOSIS — F31.9 BIPOLAR 1 DISORDER (HCC): ICD-10-CM

## 2021-09-21 LAB — ETHANOL EXG-MCNC: 0 MG/DL

## 2021-09-21 PROCEDURE — 82075 ASSAY OF BREATH ETHANOL: CPT | Performed by: EMERGENCY MEDICINE

## 2021-09-21 PROCEDURE — 99285 EMERGENCY DEPT VISIT HI MDM: CPT

## 2021-09-21 PROCEDURE — 99285 EMERGENCY DEPT VISIT HI MDM: CPT | Performed by: EMERGENCY MEDICINE

## 2021-09-21 RX ORDER — ZIPRASIDONE MESYLATE 20 MG/ML
20 INJECTION, POWDER, LYOPHILIZED, FOR SOLUTION INTRAMUSCULAR ONCE
Status: DISCONTINUED | OUTPATIENT
Start: 2021-09-21 | End: 2021-09-23 | Stop reason: HOSPADM
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
## 2021-09-21 NOTE — ED NOTES
Pt belongings    Pair of boots  1 jeans  1 shirt  1 belt  1 set of keys  1 lighter  1 pack cigarettes  1 vape       Van Murguia  09/21/21 1928

## 2021-09-21 NOTE — LETTER
[address] [address]  [address] [place] [address]  Dept: [phone]                                                           EMTALA TRANSFER CONSENT    NAME Pam Lucero                    1995                              MRN [id number]    I have been informed of my rights regarding examination, treatment, and transfer   by Dr Ilya Yanez MD    Benefits: Other benefits (Include comment)_______________________    Risks: Potential for delay in receiving treatment    Consent for Transfer:  I acknowledge that my medical condition has been evaluated and explained to me by the emergency department physician or other qualified medical person and/or my attending physician, who has recommended that I be transferred to the service of  Accepting Physician: Dr Екатерина Sanchez at [address] Name, [address] : [address] [facility] [address]  The above potential benefits of such transfer, the potential risks associated with such transfer, and the probable risks of not being transferred have been explained to me, and I fully understand them  The doctor has explained that, in my case, the benefits of transfer outweigh the risks  I agree to be transferred  I authorize the performance of emergency medical procedures and treatments upon me in both transit and upon arrival at the receiving facility  Additionally, I authorize the release of any and all medical records to the receiving facility and request they be transported with me, if possible  I understand that the safest mode of transportation during a medical emergency is an ambulance and that the Hospital advocates the use of this mode of transport  Risks of traveling to the receiving facility by car, including absence of medical control, life sustaining equipment, such as oxygen, and medical personnel has been explained to me and I fully understand them      (OSIRIS CORRECT BOX BELOW)  [ X ]  I consent to the stated transfer and to be transported by ambulance/helicopter  [  ]  I consent to the stated transfer, but refuse transportation by ambulance and accept full responsibility for my transportation by car  I understand the risks of non-ambulance transfers and I exonerate the Hospital and its staff from any deterioration in my condition that results from this refusal     X___________________________________________    DATE  21  TIME________  Signature of patient or legally responsible individual signing on patient behalf           RELATIONSHIP TO PATIENT_________________________                                  Provider Certification    NAME Janel Albright                   1995                              MRN [id number]    A medical screening exam was performed on the above named patient  Based on the examination:    Condition Necessitating Transfer The primary encounter diagnosis was Suicidal ideations  A diagnosis of Bipolar 1 disorder (HCC) was also pertinent to this visit      Patient Condition: The patient has been stabilized such that within reasonable medical probability, no material deterioration of the patient condition or the condition of the unborn child(dara) is likely to result from the transfer    Reason for Transfer: Level of Care needed not available at this facility    Transfer Requirements: [address] [facility] [address]   · Space available and qualified personnel available for treatment as acknowledged by DOV Robertson  · Agreed to accept transfer and to provide appropriate medical treatment as acknowledged by       Dr Morteza Mejía  · Appropriate medical records of the examination and treatment of the patient are provided at the time of transfer   [address], [address] _JG______  · Transfer will be performed by qualified personnel from ________________________  and appropriate transfer equipment as required, including the use of necessary and appropriate life support measures  Provider Certification: I have examined the patient and explained the following risks and benefits of being transferred/refusing transfer to the patient/family:  The patient is stable for psychiatric transfer because they are medically stable, and is protected from harming him/herself or others during transport      Based on these reasonable risks and benefits to the patient and/or the unborn child(dara), and based upon the information available at the time of the patients examination, I certify that the medical benefits reasonably to be expected from the provision of appropriate medical treatments at another medical facility outweigh the increasing risks, if any, to the individuals medical condition, and in the case of labor to the unborn child, from effecting the transfer      X____________________________________________ DATE 09/23/21        TIME_______      ORIGINAL - SEND TO MEDICAL RECORDS   COPY - SEND WITH PATIENT DURING TRANSFER

## 2021-09-22 VITALS
TEMPERATURE: 98.1 F | SYSTOLIC BLOOD PRESSURE: 136 MMHG | DIASTOLIC BLOOD PRESSURE: 76 MMHG | RESPIRATION RATE: 16 BRPM | OXYGEN SATURATION: 98 % | HEART RATE: 52 BPM

## 2021-09-22 LAB
AMPHETAMINES SERPL QL SCN: POSITIVE
BARBITURATES UR QL: NEGATIVE
BENZODIAZ UR QL: NEGATIVE
COCAINE UR QL: NEGATIVE
FLUAV RNA RESP QL NAA+PROBE: NEGATIVE
FLUBV RNA RESP QL NAA+PROBE: NEGATIVE
METHADONE UR QL: NEGATIVE
OPIATES UR QL SCN: NEGATIVE
OXYCODONE+OXYMORPHONE UR QL SCN: NEGATIVE
PCP UR QL: NEGATIVE
RSV RNA RESP QL NAA+PROBE: NEGATIVE
SARS-COV-2 RNA RESP QL NAA+PROBE: POSITIVE
SARS-COV-2 RNA RESP QL NAA+PROBE: POSITIVE
THC UR QL: POSITIVE

## 2021-09-22 PROCEDURE — 80307 DRUG TEST PRSMV CHEM ANLYZR: CPT | Performed by: EMERGENCY MEDICINE

## 2021-09-22 PROCEDURE — 0241U HB NFCT DS VIR RESP RNA 4 TRGT: CPT | Performed by: EMERGENCY MEDICINE

## 2021-09-22 PROCEDURE — 96372 THER/PROPH/DIAG INJ SC/IM: CPT

## 2021-09-22 PROCEDURE — U0003 INFECTIOUS AGENT DETECTION BY NUCLEIC ACID (DNA OR RNA); SEVERE ACUTE RESPIRATORY SYNDROME CORONAVIRUS 2 (SARS-COV-2) (CORONAVIRUS DISEASE [COVID-19]), AMPLIFIED PROBE TECHNIQUE, MAKING USE OF HIGH THROUGHPUT TECHNOLOGIES AS DESCRIBED BY CMS-2020-01-R: HCPCS | Performed by: EMERGENCY MEDICINE

## 2021-09-22 PROCEDURE — U0005 INFEC AGEN DETEC AMPLI PROBE: HCPCS | Performed by: EMERGENCY MEDICINE

## 2021-09-22 RX ORDER — LORAZEPAM 1 MG/1
2 TABLET ORAL ONCE
Status: COMPLETED | OUTPATIENT
Start: 2021-09-22 | End: 2021-09-22

## 2021-09-22 RX ORDER — NICOTINE 21 MG/24HR
21 PATCH, TRANSDERMAL 24 HOURS TRANSDERMAL ONCE
Status: COMPLETED | OUTPATIENT
Start: 2021-09-22 | End: 2021-09-23

## 2021-09-22 RX ORDER — NICOTINE 21 MG/24HR
21 PATCH, TRANSDERMAL 24 HOURS TRANSDERMAL DAILY
Status: DISCONTINUED | OUTPATIENT
Start: 2021-09-23 | End: 2021-09-23 | Stop reason: HOSPADM

## 2021-09-22 RX ORDER — OLANZAPINE 10 MG/1
10 INJECTION, POWDER, LYOPHILIZED, FOR SOLUTION INTRAMUSCULAR ONCE
Status: COMPLETED | OUTPATIENT
Start: 2021-09-22 | End: 2021-09-22

## 2021-09-22 RX ORDER — NICOTINE 21 MG/24HR
14 PATCH, TRANSDERMAL 24 HOURS TRANSDERMAL ONCE
Status: COMPLETED | OUTPATIENT
Start: 2021-09-22 | End: 2021-09-23

## 2021-09-22 RX ORDER — LORAZEPAM 1 MG/1
1 TABLET ORAL ONCE
Status: COMPLETED | OUTPATIENT
Start: 2021-09-22 | End: 2021-09-22

## 2021-09-22 RX ORDER — LORAZEPAM 1 MG/1
2 TABLET ORAL ONCE
Status: DISCONTINUED | OUTPATIENT
Start: 2021-09-22 | End: 2021-09-22

## 2021-09-22 RX ADMIN — Medication 14 MG: at 00:40

## 2021-09-22 RX ADMIN — OLANZAPINE 10 MG: 10 INJECTION, POWDER, LYOPHILIZED, FOR SOLUTION INTRAMUSCULAR at 19:46

## 2021-09-22 RX ADMIN — Medication 21 MG: at 09:26

## 2021-09-22 RX ADMIN — WATER: 1 INJECTION INTRAMUSCULAR; INTRAVENOUS; SUBCUTANEOUS at 21:00

## 2021-09-22 RX ADMIN — LORAZEPAM 2 MG: 1 TABLET ORAL at 15:03

## 2021-09-22 RX ADMIN — LORAZEPAM 2 MG: 1 TABLET ORAL at 09:27

## 2021-09-22 RX ADMIN — LORAZEPAM 1 MG: 1 TABLET ORAL at 00:40

## 2021-09-22 RX ADMIN — LORAZEPAM 2 MG: 1 TABLET ORAL at 18:53
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
## 2021-09-22 NOTE — ED NOTES
Patient states to this RN at this time, "that when he gets out, he will never come to a hospital again   Lashon Roy hear about him in the paper before he comes back to a hospital for help " When asked, patient verbalized upsetment at behavioral health policies and what is required for screening prior to seeing crisis worker and getting to a facility     Ifeanyi Hernandez RN  09/22/21 3240

## 2021-09-22 NOTE — ED NOTES
Patient pacing around room, requested Ativan and new nicotine patch to this RN, provider made aware and will order medications, will continue to monitor       Tim Galvez, JANICE  09/22/21 1607
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
## 2021-09-23 ENCOUNTER — HOSPITAL ENCOUNTER (INPATIENT)
Facility: HOSPITAL | Age: 26
LOS: 4 days | Discharge: HOME | DRG: 885 | End: 2021-09-27
Attending: STUDENT IN AN ORGANIZED HEALTH CARE EDUCATION/TRAINING PROGRAM | Admitting: STUDENT IN AN ORGANIZED HEALTH CARE EDUCATION/TRAINING PROGRAM
Payer: COMMERCIAL

## 2021-09-23 DIAGNOSIS — R45.851 SUICIDAL IDEATIONS: ICD-10-CM

## 2021-09-23 DIAGNOSIS — F31.4 BIPOLAR DISORDER WITH SEVERE DEPRESSION (HCC): Primary | ICD-10-CM

## 2021-09-23 DIAGNOSIS — F31.9 BIPOLAR 1 DISORDER (HCC): ICD-10-CM

## 2021-09-23 DIAGNOSIS — R79.89 LOW SERUM VITAMIN D: ICD-10-CM

## 2021-09-23 PROBLEM — Z00.8 MEDICAL CLEARANCE FOR PSYCHIATRIC ADMISSION: Status: ACTIVE | Noted: 2021-09-23

## 2021-09-23 PROBLEM — U07.1 COVID-19: Status: ACTIVE | Noted: 2021-09-23

## 2021-09-23 PROCEDURE — 99222 1ST HOSP IP/OBS MODERATE 55: CPT | Performed by: INTERNAL MEDICINE

## 2021-09-23 RX ORDER — BENZTROPINE MESYLATE 0.5 MG/1
0.5 TABLET ORAL
Status: DISCONTINUED | OUTPATIENT
Start: 2021-09-23 | End: 2021-09-27 | Stop reason: HOSPADM

## 2021-09-23 RX ORDER — OLANZAPINE 2.5 MG/1
5 TABLET ORAL
Status: CANCELLED | OUTPATIENT
Start: 2021-09-23

## 2021-09-23 RX ORDER — OLANZAPINE 10 MG/1
5 INJECTION, POWDER, LYOPHILIZED, FOR SOLUTION INTRAMUSCULAR
Status: DISCONTINUED | OUTPATIENT
Start: 2021-09-23 | End: 2021-09-27 | Stop reason: HOSPADM

## 2021-09-23 RX ORDER — BENZTROPINE MESYLATE 1 MG/1
0.5 TABLET ORAL
Status: CANCELLED | OUTPATIENT
Start: 2021-09-23

## 2021-09-23 RX ORDER — LORAZEPAM 1 MG/1
2 TABLET ORAL ONCE
Status: COMPLETED | OUTPATIENT
Start: 2021-09-23 | End: 2021-09-23

## 2021-09-23 RX ORDER — HYDROXYZINE HYDROCHLORIDE 25 MG/1
25 TABLET, FILM COATED ORAL
Status: CANCELLED | OUTPATIENT
Start: 2021-09-23

## 2021-09-23 RX ORDER — OLANZAPINE 5 MG/1
10 TABLET, ORALLY DISINTEGRATING ORAL ONCE
Status: COMPLETED | OUTPATIENT
Start: 2021-09-23 | End: 2021-09-23

## 2021-09-23 RX ORDER — OLANZAPINE 2.5 MG/1
2.5 TABLET ORAL
Status: DISCONTINUED | OUTPATIENT
Start: 2021-09-23 | End: 2021-09-27 | Stop reason: HOSPADM

## 2021-09-23 RX ORDER — LORAZEPAM 2 MG/ML
1 INJECTION INTRAMUSCULAR
Status: CANCELLED | OUTPATIENT
Start: 2021-09-23

## 2021-09-23 RX ORDER — LORAZEPAM 1 MG/1
1 TABLET ORAL
Status: DISCONTINUED | OUTPATIENT
Start: 2021-09-23 | End: 2021-09-27 | Stop reason: HOSPADM

## 2021-09-23 RX ORDER — NICOTINE 21 MG/24HR
1 PATCH, TRANSDERMAL 24 HOURS TRANSDERMAL DAILY
Status: DISCONTINUED | OUTPATIENT
Start: 2021-09-24 | End: 2021-09-24

## 2021-09-23 RX ORDER — LANOLIN ALCOHOL/MO/W.PET/CERES
3 CREAM (GRAM) TOPICAL
Status: CANCELLED | OUTPATIENT
Start: 2021-09-23

## 2021-09-23 RX ORDER — LORAZEPAM 0.5 MG/1
0.5 TABLET ORAL
Status: DISCONTINUED | OUTPATIENT
Start: 2021-09-23 | End: 2021-09-27 | Stop reason: HOSPADM

## 2021-09-23 RX ORDER — LORAZEPAM 0.5 MG/1
0.5 TABLET ORAL
Status: CANCELLED | OUTPATIENT
Start: 2021-09-23

## 2021-09-23 RX ORDER — LANOLIN ALCOHOL/MO/W.PET/CERES
3 CREAM (GRAM) TOPICAL
Status: DISCONTINUED | OUTPATIENT
Start: 2021-09-23 | End: 2021-09-24

## 2021-09-23 RX ORDER — NICOTINE 21 MG/24HR
1 PATCH, TRANSDERMAL 24 HOURS TRANSDERMAL DAILY
Status: CANCELLED | OUTPATIENT
Start: 2021-09-23

## 2021-09-23 RX ORDER — OLANZAPINE 2.5 MG/1
2.5 TABLET ORAL
Status: CANCELLED | OUTPATIENT
Start: 2021-09-23

## 2021-09-23 RX ORDER — ACETAMINOPHEN 325 MG/1
650 TABLET ORAL EVERY 4 HOURS PRN
Status: DISCONTINUED | OUTPATIENT
Start: 2021-09-23 | End: 2021-09-27 | Stop reason: HOSPADM

## 2021-09-23 RX ORDER — OLANZAPINE 5 MG/1
5 TABLET ORAL
Status: DISCONTINUED | OUTPATIENT
Start: 2021-09-23 | End: 2021-09-27 | Stop reason: HOSPADM

## 2021-09-23 RX ORDER — LORAZEPAM 1 MG/1
1 TABLET ORAL ONCE
Status: COMPLETED | OUTPATIENT
Start: 2021-09-23 | End: 2021-09-23

## 2021-09-23 RX ORDER — ACETAMINOPHEN 325 MG/1
650 TABLET ORAL EVERY 4 HOURS PRN
Status: CANCELLED | OUTPATIENT
Start: 2021-09-23

## 2021-09-23 RX ORDER — OMEGA-3S/DHA/EPA/FISH OIL/D3 300MG-1000
800 CAPSULE ORAL DAILY
Status: DISCONTINUED | OUTPATIENT
Start: 2021-09-24 | End: 2021-09-27 | Stop reason: HOSPADM

## 2021-09-23 RX ORDER — LORAZEPAM 2 MG/ML
1 INJECTION INTRAMUSCULAR
Status: DISCONTINUED | OUTPATIENT
Start: 2021-09-23 | End: 2021-09-27 | Stop reason: HOSPADM

## 2021-09-23 RX ORDER — OLANZAPINE 10 MG/1
5 INJECTION, POWDER, LYOPHILIZED, FOR SOLUTION INTRAMUSCULAR
Status: CANCELLED | OUTPATIENT
Start: 2021-09-23

## 2021-09-23 RX ORDER — ACETAMINOPHEN 325 MG/1
975 TABLET ORAL EVERY 6 HOURS PRN
Status: CANCELLED | OUTPATIENT
Start: 2021-09-23

## 2021-09-23 RX ORDER — HYDROXYZINE HYDROCHLORIDE 25 MG/1
25 TABLET, FILM COATED ORAL
Status: DISCONTINUED | OUTPATIENT
Start: 2021-09-23 | End: 2021-09-27 | Stop reason: HOSPADM

## 2021-09-23 RX ORDER — ACETAMINOPHEN 325 MG/1
975 TABLET ORAL EVERY 6 HOURS PRN
Status: DISCONTINUED | OUTPATIENT
Start: 2021-09-23 | End: 2021-09-27 | Stop reason: HOSPADM

## 2021-09-23 RX ORDER — LORAZEPAM 1 MG/1
1 TABLET ORAL
Status: CANCELLED | OUTPATIENT
Start: 2021-09-23

## 2021-09-23 RX ADMIN — Medication 21 MG: at 11:59

## 2021-09-23 RX ADMIN — LORAZEPAM 2 MG: 1 TABLET ORAL at 12:40

## 2021-09-23 RX ADMIN — OLANZAPINE 10 MG: 5 TABLET, ORALLY DISINTEGRATING ORAL at 13:14

## 2021-09-23 RX ADMIN — LORAZEPAM 1 MG: 1 TABLET ORAL at 21:15

## 2021-09-23 NOTE — ED NOTES
302/201 sent to [address]       [facility]  09/23/21  3761

## 2021-09-23 NOTE — ED NOTES
Patient is accepted at [facility]   Patient is accepted by Dr Medina Buitrago per Ubaldo Reno in Intake  Transportation is arranged with [facility] per Favio Conrad at [facility]  Transportation is scheduled for 10pm     Nurse report is to be called to [phone] prior to patient transfer      Susan Perea LMSW  09/23/21  7532

## 2021-09-23 NOTE — ED NOTES
Pt's 201 faxed to [facility] of Intake for consideration for an AM bed @ -      Doroteo Waldrop  09/22/21 21:29

## 2021-09-23 NOTE — ED NOTES
Security at the bedside with patient  Security informing patient that patient will be leaving our facility for the psych facility at 19:00 this evening        Awais Zuniga RN  09/23/21 5129

## 2021-09-23 NOTE — ED NOTES
CW attempted to complete pre-cert with [facility] at [phone], but waited on hold for 40 mins with no answer  CW to attempt to call again later      [facility]Van [facility], [address]  09/23/21 16:54

## 2021-09-23 NOTE — ED NOTES
Insurance Authorization for admission:   Phone call placed to Yovany Kramer  Phone number: [phone]  Spoke to [facility]      ** days approved  Level of care: 201  Review on **  Authorization # [id number]  Sean Freitas stated to f/u with a care manager in the morning to get the # of days approved, as their care managers have left for the day  EVS (Eligibility Verification System) called - [phone]  Automated system indicates: MA ineligible  Insurance Authorization for Transportation:    Phone call placed to **  Phone number **  Spoke to **     Authorization #: N[facility]zi Modi [facility] [address]  09/23/21 17:13

## 2021-09-23 NOTE — ED NOTES
Insurance Authorization for admission:   Phone call placed to [employer]  Phone number: [phone]  On hold for 50 min, and had to hang up due to ED being busy  Will have Evening CW attempt precert when free  Spoke to **      ** days approved  Level of care: [facility] (201)  Review on **  Authorization # **         EVS (Eligibility Verification System) called - [phone]  Automated system indicates: not eligible for MA  Insurance Authorization for Transportation:    Phone call placed to **  Phone number **  Spoke to **     Authorization #: Davis Vu LMSW  09/23/21  1436

## 2021-09-24 PROBLEM — F43.10 PTSD (POST-TRAUMATIC STRESS DISORDER): Status: ACTIVE | Noted: 2021-09-24

## 2021-09-24 PROBLEM — F31.4 BIPOLAR DISORDER WITH SEVERE DEPRESSION (HCC): Status: ACTIVE | Noted: 2021-09-24

## 2021-09-24 PROBLEM — Z72.0 TOBACCO USE: Status: ACTIVE | Noted: 2021-09-24

## 2021-09-24 PROBLEM — F90.9 ADHD: Status: ACTIVE | Noted: 2021-09-24

## 2021-09-24 LAB
25(OH)D3 SERPL-MCNC: 36.2 NG/ML (ref 30–100)
ALBUMIN SERPL BCP-MCNC: 4.3 G/DL (ref 3–5.2)
ALP SERPL-CCNC: 48 U/L (ref 43–122)
ALT SERPL W P-5'-P-CCNC: 20 U/L
ANION GAP SERPL CALCULATED.3IONS-SCNC: 9 MMOL/L (ref 5–14)
AST SERPL W P-5'-P-CCNC: 23 U/L (ref 17–59)
BASOPHILS # BLD AUTO: 0 THOUSANDS/ΜL (ref 0–0.1)
BASOPHILS NFR BLD AUTO: 0 % (ref 0–1)
BILIRUB SERPL-MCNC: 0.69 MG/DL
BUN SERPL-MCNC: 15 MG/DL (ref 5–25)
CALCIUM SERPL-MCNC: 9.8 MG/DL (ref 8.4–10.2)
CARBAMAZEPINE SERPL-MCNC: <3 UG/ML (ref 4–12)
CHLORIDE SERPL-SCNC: 104 MMOL/L (ref 97–108)
CHOLEST SERPL-MCNC: 118 MG/DL
CK SERPL-CCNC: 72 U/L (ref 55–170)
CO2 SERPL-SCNC: 27 MMOL/L (ref 22–30)
CREAT SERPL-MCNC: 0.84 MG/DL (ref 0.7–1.5)
CRP SERPL QL: <5 MG/L
EOSINOPHIL # BLD AUTO: 0.1 THOUSAND/ΜL (ref 0–0.4)
EOSINOPHIL NFR BLD AUTO: 2 % (ref 0–6)
ERYTHROCYTE [DISTWIDTH] IN BLOOD BY AUTOMATED COUNT: 12.4 %
FOLATE SERPL-MCNC: 13.8 NG/ML (ref 3.1–17.5)
GFR SERPL CREATININE-BSD FRML MDRD: 123 ML/MIN/1.73SQ M
GLUCOSE SERPL-MCNC: 90 MG/DL (ref 70–99)
HCT VFR BLD AUTO: 45 % (ref 41–53)
HDLC SERPL-MCNC: 33 MG/DL
HGB BLD-MCNC: 15.1 G/DL (ref 13.5–17.5)
LDLC SERPL CALC-MCNC: 66 MG/DL
LYMPHOCYTES # BLD AUTO: 2.1 THOUSANDS/ΜL (ref 0.5–4)
LYMPHOCYTES NFR BLD AUTO: 39 % (ref 25–45)
MCH RBC QN AUTO: 29.5 PG (ref 26–34)
MCHC RBC AUTO-ENTMCNC: 33.6 G/DL (ref 31–36)
MCV RBC AUTO: 88 FL (ref 80–100)
MONOCYTES # BLD AUTO: 0.5 THOUSAND/ΜL (ref 0.2–0.9)
MONOCYTES NFR BLD AUTO: 9 % (ref 1–10)
NEUTROPHILS # BLD AUTO: 2.8 THOUSANDS/ΜL (ref 1.8–7.8)
NEUTS SEG NFR BLD AUTO: 50 % (ref 45–65)
NONHDLC SERPL-MCNC: 85 MG/DL
NT-PROBNP SERPL-MCNC: <12 PG/ML (ref 0–299)
PLATELET # BLD AUTO: 218 THOUSANDS/UL (ref 150–450)
PMV BLD AUTO: 8.5 FL (ref 8.9–12.7)
POTASSIUM SERPL-SCNC: 4.1 MMOL/L (ref 3.6–5)
PROT SERPL-MCNC: 7.3 G/DL (ref 5.9–8.4)
RBC # BLD AUTO: 5.13 MILLION/UL (ref 4.5–5.9)
SODIUM SERPL-SCNC: 140 MMOL/L (ref 137–147)
TRIGL SERPL-MCNC: 95 MG/DL
TROPONIN I SERPL-MCNC: <0.01 NG/ML (ref 0–0.03)
TSH SERPL DL<=0.05 MIU/L-ACNC: 0.38 UIU/ML (ref 0.47–4.68)
VIT B12 SERPL-MCNC: 611 PG/ML (ref 100–900)
WBC # BLD AUTO: 5.5 THOUSAND/UL (ref 4.5–11)

## 2021-09-24 PROCEDURE — 99221 1ST HOSP IP/OBS SF/LOW 40: CPT | Performed by: PSYCHIATRY & NEUROLOGY

## 2021-09-24 PROCEDURE — 85025 COMPLETE CBC W/AUTO DIFF WBC: CPT | Performed by: STUDENT IN AN ORGANIZED HEALTH CARE EDUCATION/TRAINING PROGRAM

## 2021-09-24 PROCEDURE — 86592 SYPHILIS TEST NON-TREP QUAL: CPT | Performed by: STUDENT IN AN ORGANIZED HEALTH CARE EDUCATION/TRAINING PROGRAM

## 2021-09-24 PROCEDURE — 84443 ASSAY THYROID STIM HORMONE: CPT | Performed by: STUDENT IN AN ORGANIZED HEALTH CARE EDUCATION/TRAINING PROGRAM

## 2021-09-24 PROCEDURE — 84484 ASSAY OF TROPONIN QUANT: CPT | Performed by: PHYSICIAN ASSISTANT

## 2021-09-24 PROCEDURE — 82746 ASSAY OF FOLIC ACID SERUM: CPT | Performed by: STUDENT IN AN ORGANIZED HEALTH CARE EDUCATION/TRAINING PROGRAM

## 2021-09-24 PROCEDURE — 87389 HIV-1 AG W/HIV-1&-2 AB AG IA: CPT | Performed by: STUDENT IN AN ORGANIZED HEALTH CARE EDUCATION/TRAINING PROGRAM

## 2021-09-24 PROCEDURE — 83880 ASSAY OF NATRIURETIC PEPTIDE: CPT | Performed by: PHYSICIAN ASSISTANT

## 2021-09-24 PROCEDURE — 82306 VITAMIN D 25 HYDROXY: CPT | Performed by: STUDENT IN AN ORGANIZED HEALTH CARE EDUCATION/TRAINING PROGRAM

## 2021-09-24 PROCEDURE — 93005 ELECTROCARDIOGRAM TRACING: CPT

## 2021-09-24 PROCEDURE — 80156 ASSAY CARBAMAZEPINE TOTAL: CPT | Performed by: STUDENT IN AN ORGANIZED HEALTH CARE EDUCATION/TRAINING PROGRAM

## 2021-09-24 PROCEDURE — 86140 C-REACTIVE PROTEIN: CPT | Performed by: PHYSICIAN ASSISTANT

## 2021-09-24 PROCEDURE — 80053 COMPREHEN METABOLIC PANEL: CPT | Performed by: PSYCHIATRY & NEUROLOGY

## 2021-09-24 PROCEDURE — 80061 LIPID PANEL: CPT | Performed by: PSYCHIATRY & NEUROLOGY

## 2021-09-24 PROCEDURE — 82550 ASSAY OF CK (CPK): CPT | Performed by: PHYSICIAN ASSISTANT

## 2021-09-24 PROCEDURE — 82607 VITAMIN B-12: CPT | Performed by: STUDENT IN AN ORGANIZED HEALTH CARE EDUCATION/TRAINING PROGRAM

## 2021-09-24 RX ORDER — ARIPIPRAZOLE 10 MG/1
10 TABLET ORAL
Status: DISCONTINUED | OUTPATIENT
Start: 2021-09-24 | End: 2021-09-25

## 2021-09-24 RX ORDER — NICOTINE 21 MG/24HR
21 PATCH, TRANSDERMAL 24 HOURS TRANSDERMAL DAILY
Status: DISCONTINUED | OUTPATIENT
Start: 2021-09-24 | End: 2021-09-27 | Stop reason: HOSPADM

## 2021-09-24 RX ADMIN — LORAZEPAM 1 MG: 2 INJECTION INTRAMUSCULAR; INTRAVENOUS at 19:54

## 2021-09-24 RX ADMIN — NICOTINE POLACRILEX 4 MG: 4 GUM, CHEWING ORAL at 18:38

## 2021-09-24 RX ADMIN — NICOTINE 1 PATCH: 14 PATCH, EXTENDED RELEASE TRANSDERMAL at 08:54

## 2021-09-24 RX ADMIN — CHOLECALCIFEROL TAB 10 MCG (400 UNIT) 800 UNITS: 10 TAB at 08:54

## 2021-09-24 RX ADMIN — ARIPIPRAZOLE 10 MG: 10 TABLET ORAL at 21:05

## 2021-09-24 RX ADMIN — NICOTINE POLACRILEX 4 MG: 4 GUM, CHEWING ORAL at 15:39

## 2021-09-24 RX ADMIN — DEXTROAMPHETAMINE SACCHARATE, AMPHETAMINE ASPARTATE, DEXTROAMPHETAMINE SULFATE AND AMPHETAMINE SULFATE 15 MG: 2.5; 2.5; 2.5; 2.5 TABLET ORAL at 12:00

## 2021-09-24 RX ADMIN — NICOTINE POLACRILEX 4 MG: 4 GUM, CHEWING ORAL at 12:53

## 2021-09-24 RX ADMIN — NICOTINE POLACRILEX 4 MG: 4 GUM, CHEWING ORAL at 21:35

## 2021-09-24 RX ADMIN — NICOTINE 21 MG: 21 PATCH, EXTENDED RELEASE TRANSDERMAL at 11:57

## 2021-09-24 RX ADMIN — LORAZEPAM 1 MG: 1 TABLET ORAL at 10:40
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
## 2021-09-24 NOTE — CONSULTS
[address] 1995, 26 y o  male MRN: [id number]  Unit/Bed#: Blanca Villaseñor 171-19 Encounter: [id number]  Primary Care Provider: Rolando Clemons DO   Date and time admitted to hospital: 9/23/2021 10:29 PM    [facility]  Consult performed by: Manish Barbour PA-C  Consult ordered by: Renny Rubio MD        Medical clearance for psychiatric admission  Assessment & Plan  Patient seen and examined today, medically clear for admission to [facility]    Episode of recurrent major depressive disorder [facility]  Assessment & Plan  · Patient originally presented to [facility] ED on 09/21/2021 with 302 petition for evaluation of suicidal ideation  · Management per Psychiatry    COVID-19  Assessment & Plan  · Patient was diagnosed COVID + on 09/22/2021  · Patient is currently asymptomatic with O2 sats at 97% on room air  Currently following the mild pathway  · Morning Labs:  · ABO:  · Troponin:  · CK:  · BNP:  · CRP:  · Ferritin:  · D-Dimer:  · Continue to monitor vitals, symptoms      Class 1 obesity due to excess calories without serious comorbidity with body mass index (BMI) of 30 0 to 30 9 in adult  Assessment & Plan  · Encourage healthy diet, exercise    ECT Clearance:   History of recent seizure or stroke:  No   History of pheochromocytoma:  No   History of active bleeding (Intracranial hemorrhage, aneurysm or AVM):  No   History of metallic implants in the head or neck:  No   History of increased intracranial pressure with mass effect:  No   Does the patient have a current arrhythmia? No      Based on above criteria, Patient is medically cleared for ECT should it be recommended  Counseling / Coordination of Care Time: 20 minutes  Greater than 50% of total time spent on patient counseling and coordination of care  Collaboration of Care:  Were Recommendations Directly Discussed with Primary Treatment Team? - No History of Present Illness:    Emil Velasco is a 26 y o  male who is originally admitted to the psychiatry service due to [place] 1  We are consulted for medical clearance for admission to [address] [place] [address] and treatment of underlying psychiatric illness  This patient tested COVID positive upon routine screening in the [facility] ED  He currently denies HA, chest pain, sob, abdominal pain/nausea/vomiting/diarrhea/ body aches, chills or sweats  Only admits to a "cold" a few weeks ago that only lasted a couple of days  Review of Systems:    Review of Systems   Constitutional: Negative for chills and fever  HENT: Negative for congestion and sore throat  Eyes: Negative for pain and visual disturbance  Respiratory: Negative for cough and shortness of breath  Cardiovascular: Negative for chest pain and palpitations  Gastrointestinal: Negative for abdominal pain, constipation, diarrhea and nausea  Genitourinary: Negative for difficulty urinating, dysuria, frequency and urgency  Musculoskeletal: Negative for arthralgias and myalgias  Skin: Negative for color change and rash  Neurological: Negative for seizures, weakness and headaches  All other systems reviewed and are negative  Past Medical and Surgical History:     Past Medical History:   Diagnosis Date    Bipolar 1 disorder ([facility] [address] )     Depression        No past surgical history on file  Meds/Allergies:    all medications and allergies reviewed    Allergies: No Known Allergies    Social History:     Marital Status: Single    Substance Use History:   Social History     Substance and Sexual Activity   Alcohol Use Yes    Comment: social     Social History     Tobacco Use   Smoking Status Light Tobacco Smoker   Smokeless Tobacco Former User     Social History     Substance and Sexual Activity   Drug Use Not Currently    Types: Marijuana       Family History:    History reviewed  No pertinent family history      Physical Exam: Vitals:   Blood Pressure: 133/76 (09/23/21 2233)  Pulse: 56 (09/23/21 2233)  Temperature: 97 6 °F (36 4 °C) (97 6) (09/23/21 2233)  Temp Source: Tympanic (09/23/21 2233)  Respirations: 16 (09/23/21 2233)  Height: 5' 11" (180 3 cm) (09/23/21 2233)  Weight - Scale: 98 9 kg (218 lb) (09/23/21 2233)  SpO2: 97 % (09/23/21 2233)    Physical Exam  Constitutional:       General: He is not in acute distress  Appearance: Normal appearance  He is not ill-appearing, toxic-appearing or diaphoretic  HENT:      Head: Normocephalic and atraumatic  Mouth/Throat:      Mouth: Mucous membranes are moist    Eyes:      General: No scleral icterus  Cardiovascular:      Rate and Rhythm: Normal rate and regular rhythm  Pulses: Normal pulses  Heart sounds: Normal heart sounds  No murmur heard  No friction rub  No gallop  Pulmonary:      Effort: Pulmonary effort is normal  No respiratory distress  Breath sounds: Normal breath sounds  No wheezing or rhonchi  Abdominal:      General: Abdomen is flat  Bowel sounds are normal  There is no distension  Palpations: Abdomen is soft  Tenderness: There is no abdominal tenderness  Musculoskeletal:      Cervical back: Normal range of motion  Right lower leg: No edema  Left lower leg: No edema  Skin:     General: Skin is warm and dry  Coloration: Skin is not jaundiced  Neurological:      General: No focal deficit present  Mental Status: He is alert  Additional Data:     Lab Results: I have personally reviewed pertinent reports  No results found for: HGBA1C        EKG, Pathology, and Other Studies Reviewed on Admission:   · EKG- none available    ** Please Note: This note has been constructed using a voice recognition system   **

## 2021-09-24 NOTE — ASSESSMENT & PLAN NOTE
· Patient was diagnosed COVID + on 09/22/2021  · Patient is currently asymptomatic with O2 sats at 97% on room air  Currently following the mild pathway    · Morning Labs:  · ABO:  · Troponin: <0 01  · CK: 72  · BNP: <12  · CRP: <5 0  · Continue to monitor vitals, symptoms
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
## 2021-09-24 NOTE — NURSING NOTE
Patient reports feeling anxious  and ask for Ativan PRN, this writer  offer Ativan 1 mg PRN PO, patient states he prefer the IM option, patient states "It works faster plus it helps me to fall sleep"
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
## 2021-09-24 NOTE — ASSESSMENT & PLAN NOTE
· Patient reports smoking cigars and nicotine vapes  · Requesting nicorette gum and patch  · Will order nicoderm and nicorette  · Encourage cessation

## 2021-09-24 NOTE — PLAN OF CARE
Problem: Risk for Self Injury/Neglect  Goal: Treatment Goal: Remain safe during length of stay, learn and adopt new coping skills, and be free of self-injurious ideation, impulses and acts at the time of discharge  Outcome: Progressing     Problem: Depression  Goal: Treatment Goal: Demonstrate behavioral control of depressive symptoms, verbalize feelings of improved mood/affect, and adopt new coping skills prior to discharge  Outcome: Progressing  Goal: Verbalize thoughts and feelings  Description: Interventions:  - Assess and re-assess patient's level of risk   - Engage patient in 1:1 interactions, daily, for a minimum of 15 minutes   - Encourage patient to express feelings, fears, frustrations, hopes   Outcome: Progressing     Problem: Anxiety  Goal: Anxiety is at manageable level  Description: Interventions:  - Assess and monitor patient's anxiety level  - Monitor for signs and symptoms (heart palpitations, chest pain, shortness of breath, headaches, nausea, feeling jumpy, restlessness, irritable, apprehensive)  - Collaborate with interdisciplinary team and initiate plan and interventions as ordered    - [place] patient to unit/surroundings  - Explain treatment plan  - Encourage participation in care  - Encourage verbalization of concerns/fears  - Identify coping mechanisms  - Assist in developing anxiety-reducing skills  - Administer/offer alternative therapies  - Limit or eliminate stimulants  Outcome: Progressing     Problem: Risk for Violence/Aggression Toward Others  Goal: Treatment Goal: Refrain from acts of violence/aggression during length of stay, and demonstrate improved impulse control at the time of discharge  Outcome: Progressing  Goal: Refrain from destructive acts on the environment or property  Outcome: Progressing  Goal: Control angry outbursts  Description: Interventions:  - Monitor patient closely, per order  - Ensure early verbal de-escalation  - Monitor prn medication needs  - Set reasonable/therapeutic limits, outline behavioral expectations, and consequences   - Provide a non-threatening milieu, utilizing the least restrictive interventions   Outcome: Progressing     Problem: Nutrition/Hydration-ADULT  Goal: Nutrient/Hydration intake appropriate for improving, restoring or maintaining nutritional needs  Description: Monitor and assess patient's nutrition/hydration status for malnutrition  Collaborate with interdisciplinary team and initiate plan and interventions as ordered  Monitor patient's weight and dietary intake as ordered or per policy  Utilize nutrition screening tool and intervene as necessary  Determine patient's food preferences and provide high-protein, high-caloric foods as appropriate       INTERVENTIONS:  - Monitor oral intake, urinary output, labs, and treatment plans  - Assess nutrition and hydration status and recommend course of action  - Evaluate amount of meals eaten  - Assist patient with eating if necessary   - Allow adequate time for meals  - Recommend/ encourage appropriate diets, oral nutritional supplements, and vitamin/mineral supplements  - Order, calculate, and assess calorie counts as needed  - Recommend, monitor, and adjust tube feedings and TPN/PPN based on assessed needs  - Assess need for intravenous fluids  - Provide specific nutrition/hydration education as appropriate  - Include patient/family/caregiver in decisions related to nutrition  Outcome: Progressing

## 2021-09-24 NOTE — PROGRESS NOTES
09/24/21 1431   Team Meeting   Meeting Type Tx Team Meeting   Initial Conference Date 09/24/21   Team Members Present   Team Members Present Physician;Nurse;   Physician Team Member Dr Johnny Tripathi Team Member [facility] Management Team Member Bernadette   Patient/Family Present   Patient Present Yes   Patient's Family Present No     Treatment goals include: building effective coping skills, refraining from self injury/neglect, decreasing depression/anxiety, refraining from violence/aggression, discharge planning

## 2021-09-24 NOTE — ED NOTES
Insurance Authorization for admission:   Phone call placed to [facility] number: [phone]  Spoke to Harish Henry   5 days approved  Level of care: 201-/IP  Review on 9/27th  Authorization # [id number]       [phone], #3 continued stay review    EVS (Eligibility Verification System) called - [phone]    Automated system indicates: not eligible    Insurance Authorization for Transportation:    No needed for higher level of care

## 2021-09-24 NOTE — ASSESSMENT & PLAN NOTE
· Patient originally presented to [facility] ED on 09/21/2021 with 302 petition for evaluation of suicidal ideation  · Management per Psychiatry

## 2021-09-24 NOTE — TREATMENT PLAN
TREATMENT PLAN REVIEW - Puneet 58 M Young 26 y o  1995 male MRN: [id number]    [address] Room / Bed: Ade Farrell [facility] 511-69 Encounter: [id number]          Admit Date/Time:  9/23/2021 10:29 PM    Treatment Team: Attending Provider: Nataly West DO; Registered Nurse: Kaylee Waller RN; Patient Care Assistant: Jeremy Tee; Patient Care Technician: Yunior Brower; Patient Care Assistant: Heri Prasad; Patient Care Assistant: Ryan Dawson; Registered Nurse: Gracia Martinez RN; Occupational Therapy Assistant: Tula Pap, Griselda Hopping;  Registered Nurse: Amita Reyes; Nurse Manager: Myke Davidson RN; : Chepe Oconnell    Diagnosis: Principal Problem:    Bipolar disorder with severe depression ([facility] )  Active Problems:    Class 1 obesity due to excess calories without serious comorbidity with body mass index (BMI) of 30 0 to 30 9 in adult    Medical clearance for psychiatric admission    COVID-19    Episode of recurrent major depressive disorder ([facility] )    PTSD (post-traumatic stress disorder)    ADHD      Patient Strengths/Assets: patient is on a voluntary commitment, supportive family/friends, work skills    Patient Barriers/Limitations: difficulty adapting, noncompliant with medication, poor reasoning ability    Short Term Goals: decrease in depressive symptoms, decrease in suicidal thoughts, mood stabilization    Long Term Goals: resolution of depressive symptoms, stabilization of mood, free of suicidal thoughts    Progress Towards Goals: starting psychiatric medications as prescribed    Recommended Treatment: medication management, patient medication education, group therapy, milieu therapy, continued Behavioral Health psychiatric evaluation/assessment process    Treatment Frequency: daily medication monitoring, group and milieu therapy daily, monitoring through interdisciplinary rounds, monitoring through weekly patient care conferences    Expected Discharge Date:  5-7 days    Discharge Plan: referrals as indicated    Treatment Plan Created/Updated By: Ivelisse Littlejohn DO

## 2021-09-24 NOTE — NURSING NOTE
Cooperative, irritable edge d/t boredom on the unit  Paces halls and works out in room as a coping skill  Reports good sleep  Denies SI, does report "not caring if I wake up "  Denies HI/AVH  Hopeful to discharge stating "I don't belong here and I have things to do "  Denies further concerns

## 2021-09-24 NOTE — PLAN OF CARE
Problem: Ineffective Coping  Goal: Cooperates with admission process  Description: Interventions:   - Complete admission process  Outcome: Progressing     Problem: Risk for Self Injury/Neglect  Goal: Treatment Goal: Remain safe during length of stay, learn and adopt new coping skills, and be free of self-injurious ideation, impulses and acts at the time of discharge  Outcome: Progressing     Problem: Anxiety  Goal: Anxiety is at manageable level  Description: Interventions:  - Assess and monitor patient's anxiety level  - Monitor for signs and symptoms (heart palpitations, chest pain, shortness of breath, headaches, nausea, feeling jumpy, restlessness, irritable, apprehensive)  - Collaborate with interdisciplinary team and initiate plan and interventions as ordered    - [place] patient to unit/surroundings  - Explain treatment plan  - Encourage participation in care  - Encourage verbalization of concerns/fears  - Identify coping mechanisms  - Assist in developing anxiety-reducing skills  - Administer/offer alternative therapies  - Limit or eliminate stimulants  Outcome: Progressing     Problem: Risk for Violence/Aggression Toward Others  Goal: Treatment Goal: Refrain from acts of violence/aggression during length of stay, and demonstrate improved impulse control at the time of discharge  Outcome: Progressing     Problem: Ineffective Coping  Goal: Identifies ineffective coping skills  Outcome: Not Progressing  Goal: Identifies healthy coping skills  Outcome: Not Progressing     Problem: Depression  Goal: Treatment Goal: Demonstrate behavioral control of depressive symptoms, verbalize feelings of improved mood/affect, and adopt new coping skills prior to discharge  Outcome: Not Progressing  Goal: Verbalize thoughts and feelings  Description: Interventions:  - Assess and re-assess patient's level of risk   - Engage patient in 1:1 interactions, daily, for a minimum of 15 minutes   - Encourage patient to express feelings, fears, frustrations, hopes   Outcome: Not Progressing

## 2021-09-24 NOTE — ASSESSMENT & PLAN NOTE
· Patient with history of bipolar one disorder  · Patient originally presented to [facility] ED on 09/21/2021 with 302 petition for evaluation of suicidal ideation  · Management per Psychiatry

## 2021-09-24 NOTE — QUICK NOTE
Attempted to see patient this evening for medical clearance consult, however patient sleeping at this time  Per discussion with treatment team RN, patient irritable upon arrival, offering scant verbal communication  Patient is COVID positive, most recent vitals reviewed and stable  Will attempt to see patient for medical clearance consult once awake

## 2021-09-25 LAB
ATRIAL RATE: 45 BPM
ATRIAL RATE: 45 BPM
ATRIAL RATE: 76 BPM
P AXIS: 63 DEGREES
P AXIS: 63 DEGREES
P AXIS: 66 DEGREES
PR INTERVAL: 134 MS
QRS AXIS: 41 DEGREES
QRS AXIS: 44 DEGREES
QRS AXIS: 44 DEGREES
QRSD INTERVAL: 100 MS
QRSD INTERVAL: 96 MS
QRSD INTERVAL: 96 MS
QT INTERVAL: 344 MS
QT INTERVAL: 416 MS
QT INTERVAL: 416 MS
QTC INTERVAL: 359 MS
QTC INTERVAL: 359 MS
QTC INTERVAL: 387 MS
T WAVE AXIS: 32 DEGREES
T WAVE AXIS: 32 DEGREES
T WAVE AXIS: 35 DEGREES
VENTRICULAR RATE: 45 BPM
VENTRICULAR RATE: 45 BPM
VENTRICULAR RATE: 76 BPM

## 2021-09-25 PROCEDURE — 99232 SBSQ HOSP IP/OBS MODERATE 35: CPT | Performed by: STUDENT IN AN ORGANIZED HEALTH CARE EDUCATION/TRAINING PROGRAM

## 2021-09-25 PROCEDURE — 93010 ELECTROCARDIOGRAM REPORT: CPT | Performed by: INTERNAL MEDICINE

## 2021-09-25 PROCEDURE — 87591 N.GONORRHOEAE DNA AMP PROB: CPT | Performed by: STUDENT IN AN ORGANIZED HEALTH CARE EDUCATION/TRAINING PROGRAM

## 2021-09-25 PROCEDURE — 87491 CHLMYD TRACH DNA AMP PROBE: CPT | Performed by: STUDENT IN AN ORGANIZED HEALTH CARE EDUCATION/TRAINING PROGRAM

## 2021-09-25 PROCEDURE — 93005 ELECTROCARDIOGRAM TRACING: CPT

## 2021-09-25 RX ORDER — ARIPIPRAZOLE 10 MG/1
10 TABLET ORAL DAILY
Status: DISCONTINUED | OUTPATIENT
Start: 2021-09-25 | End: 2021-09-27 | Stop reason: HOSPADM

## 2021-09-25 RX ORDER — TRAZODONE HYDROCHLORIDE 50 MG/1
50 TABLET ORAL
Status: DISCONTINUED | OUTPATIENT
Start: 2021-09-25 | End: 2021-09-27 | Stop reason: HOSPADM

## 2021-09-25 RX ADMIN — CHOLECALCIFEROL TAB 10 MCG (400 UNIT) 800 UNITS: 10 TAB at 08:44

## 2021-09-25 RX ADMIN — NICOTINE POLACRILEX 4 MG: 4 GUM, CHEWING ORAL at 12:29

## 2021-09-25 RX ADMIN — NICOTINE POLACRILEX 4 MG: 4 GUM, CHEWING ORAL at 20:21

## 2021-09-25 RX ADMIN — NICOTINE 21 MG: 21 PATCH, EXTENDED RELEASE TRANSDERMAL at 08:45

## 2021-09-25 RX ADMIN — DEXTROAMPHETAMINE SACCHARATE, AMPHETAMINE ASPARTATE, DEXTROAMPHETAMINE SULFATE AND AMPHETAMINE SULFATE 15 MG: 2.5; 2.5; 2.5; 2.5 TABLET ORAL at 12:08

## 2021-09-25 RX ADMIN — DEXTROAMPHETAMINE SACCHARATE, AMPHETAMINE ASPARTATE, DEXTROAMPHETAMINE SULFATE AND AMPHETAMINE SULFATE 15 MG: 2.5; 2.5; 2.5; 2.5 TABLET ORAL at 08:44

## 2021-09-25 RX ADMIN — ARIPIPRAZOLE 10 MG: 10 TABLET ORAL at 12:08

## 2021-09-25 RX ADMIN — NICOTINE POLACRILEX 4 MG: 4 GUM, CHEWING ORAL at 05:01

## 2021-09-25 RX ADMIN — LORAZEPAM 1 MG: 1 TABLET ORAL at 18:33

## 2021-09-25 RX ADMIN — NICOTINE POLACRILEX 4 MG: 4 GUM, CHEWING ORAL at 17:11

## 2021-09-25 RX ADMIN — TRAZODONE HYDROCHLORIDE 50 MG: 50 TABLET ORAL at 20:21

## 2021-09-25 NOTE — PROGRESS NOTES
Progress Note - [address] [facility] 26 y o  male MRN: [id number]  Unit/Bed#: [facility] 454-79 Encounter: [id number]    All documentation, nursing notes, labs, and vitals reviewed  The patient's medication reconciliation chart was also analyzed for medication adherence  I personally evaluated Naresh Li and discussed current care with treatment team   No acute events overnight  On examination today, Elisabeth Landa is dysphoric and affect and endorses ongoing depression  He states that his energy and motivation are limited  He believes being in the inpatient psychiatric unit is serving to amplify his depressive symptomatology as he has no one to talk to  I encouraged him to utilize the phone on the unit to call friends and family to which she was agreeable  Isabella Brown speaks at length about the events that preceded his hospitalization  He states that he has trialed numerous psychotropic medications in the past for bipolar depression, without success  He is fearful regarding weight gain given previous neuroleptic trials in the past   I discussed with him that Abilify is weight neutral and he was agreeable to continue this medication  On examination, narciso denies overt lethality concern  He states that he is without thoughts of suicide or self-harm  He has no plans to harm others  He is future oriented, detailing goals of obtaining employment upon discharge from the hospital   He describes himself as a outdoorsman" and is interested in hunting and chopping wood for the upcoming winter upon discharge  Isabella Brown denies hopelessness or daily crying spells  He does report anhedonia  He lacks social support  He has minimal insight into his disease process and is very discharge focused  Supportive therapy and verbal redirecting utilized  On examination, narciso denies overt symptomatology suggestive of kandice/hypomania    He is not irritable, grandiose, pressured in speech, or pathologically euphoric  He denies acute perceptual anomalies, such as auditory/visual is admissions, paranoia, referential ideation, or delusional beliefs  He endorses ongoing anxiety, nervousness, and feeling on edge  Given his problematic relationships over the last year, narciso request STD panel which was ordered  At the moment, narciso endorses profound insomnia  He states that he was up for 30 hours at a time prior to his hospitalization  Last evening, he slept for total of 2 hours  I suspect this is likely due to his Abilify being dosed a q h s , which is often activating  As such, will move to daily dosing  Will also add trazodone 50 milligrams q h s  P r n  For insomnia  Risks/benefits/alternatives to treatment were discussed which Deon Gagnon was agreeable  He offers no further complaints      Mental Status Evaluation:    Appearance:  age appropriate, casually dressed, dressed appropriately   Behavior:  pleasant, cooperative, calm   Speech:  normal rate, normal volume, normal pitch, fluent   Mood:  depressed, anxious   Affect:  constricted   Thought Process:  organized, logical, coherent, goal directed   Associations: intact associations   Thought Content:  no overt delusions   Perceptual Disturbances: no auditory hallucinations, no visual hallucinations   Risk Potential: Suicidal ideation - None at present  Homicidal ideation - None at present  Potential for aggression - No   Sensorium:  oriented to person, place and time/date   Memory:  recent and remote memory grossly intact   Consciousness:  alert and awake    Attention: attention span and concentration are age appropriate   Insight:  limited   Judgment: limited   Gait/Station: normal gait/station   Motor Activity: no abnormal movements       Assessment:     Principal Problem:    Bipolar disorder with severe depression (HCC)  Active Problems:    Class 1 obesity due to excess calories without serious comorbidity with body mass index (BMI) of 30 0 to 30 9 in adult    Medical clearance for psychiatric admission    COVID-19    Episode of recurrent major depressive disorder ([facility] )    PTSD (post-traumatic stress disorder)    ADHD    Tobacco use      Plan/Recommended Treatment:     - Continue with pharmacotherapy, group therapy, milieu therapy and occupational therapy  - Risks/benefits/alternatives to treatment discussed and Aldo Gunter continues to verbalize understanding   - Change dosing of Abilify 10mg from QHS to AM as this agent is activating and lkel worsening insomnia  - Add Trazodone 50mg QHS PRN to regimen for sleep assistance  - Order STD panel    - Will consider further optimization of psychotropic medication regimen as hospital course progresses   - Continue to assess for adverse medication side effects   - Encourage Aldo Gunter to participate in nonverbal forms of therapy including journaling and art/music therapy  - Continue precautionary Q7-minute safety checks  - Continue to engage case management/SW to assist with collateral information, discharge planning, and the implementation of an individualized, patient-centered plan of care    - The patient will be maintained on the following medications:    Current Facility-Administered Medications   Medication Dose Route Frequency Provider Last Rate    acetaminophen  650 mg Oral Q4H PRN Danielle Wyatt MD      acetaminophen  650 mg Oral Q4H PRN Danielle Wyatt MD      acetaminophen  975 mg Oral Q6H PRN Danielle Wyatt MD      amphetamine-dextroamphetamine  15 mg Oral BID Logan Dewey DO      ARIPiprazole  10 mg Oral Daily Az Mosqueda MD      benztropine  0 5 mg Oral Q4H PRN Max 6/day Danielle Wyatt MD      cholecalciferol  800 Units Oral Daily Selena Vazquez PA-C      hydrOXYzine HCL  25 mg Oral Q6H PRN Max 4/day Danielle Wyatt MD      LORazepam  1 mg Intramuscular Q6H PRN Max 3/day Danielle Wyatt MD      LORazepam  0 5 mg Oral Q6H PRN Max 4/day Irena Castaneda MD Dev      LORazepam  1 mg Oral Q6H PRN Max 3/day Tahir Alexis MD      nicotine  21 mg Transdermal Daily Eliseo Moreno PA-C      nicotine polacrilex  4 mg Oral Q2H PRN Eliseo Moreno PA-C      OLANZapine  5 mg Intramuscular Q3H PRN Max 3/day Tahir Alexis MD      OLANZapine  2 5 mg Oral Q4H PRN Max 6/day Tahir Alexis MD      OLANZapine  5 mg Oral Q4H PRN Max 3/day Tahir Alexis MD      OLANZapine  5 mg Oral Q3H PRN Max 3/day Tahir Alexis MD      traZODone  50 mg Oral HS PRN Roxie West MD
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
## 2021-09-25 NOTE — NURSING NOTE
Patient labile and cooperative on approach denies s/s  Patient in and out of his room with good interaction with peers  Patient compliant with medications in the shift

## 2021-09-25 NOTE — PLAN OF CARE
Problem: Ineffective Coping  Goal: Cooperates with admission process  Description: Interventions:   - Complete admission process  Outcome: Progressing  Goal: Identifies ineffective coping skills  Outcome: Progressing  Goal: Identifies healthy coping skills  Outcome: Progressing     Problem: Risk for Self Injury/Neglect  Goal: Treatment Goal: Remain safe during length of stay, learn and adopt new coping skills, and be free of self-injurious ideation, impulses and acts at the time of discharge  Outcome: Progressing     Problem: Depression  Goal: Treatment Goal: Demonstrate behavioral control of depressive symptoms, verbalize feelings of improved mood/affect, and adopt new coping skills prior to discharge  Outcome: Progressing  Goal: Verbalize thoughts and feelings  Description: Interventions:  - Assess and re-assess patient's level of risk   - Engage patient in 1:1 interactions, daily, for a minimum of 15 minutes   - Encourage patient to express feelings, fears, frustrations, hopes   Outcome: Progressing     Problem: Anxiety  Goal: Anxiety is at manageable level  Description: Interventions:  - Assess and monitor patient's anxiety level  - Monitor for signs and symptoms (heart palpitations, chest pain, shortness of breath, headaches, nausea, feeling jumpy, restlessness, irritable, apprehensive)  - Collaborate with interdisciplinary team and initiate plan and interventions as ordered    - [place] patient to unit/surroundings  - Explain treatment plan  - Encourage participation in care  - Encourage verbalization of concerns/fears  - Identify coping mechanisms  - Assist in developing anxiety-reducing skills  - Administer/offer alternative therapies  - Limit or eliminate stimulants  Outcome: Progressing     Problem: Risk for Violence/Aggression Toward Others  Goal: Treatment Goal: Refrain from acts of violence/aggression during length of stay, and demonstrate improved impulse control at the time of discharge  Outcome: Progressing  Goal: Refrain from destructive acts on the environment or property  Outcome: Progressing  Goal: Control angry outbursts  Description: Interventions:  - Monitor patient closely, per order  - Ensure early verbal de-escalation  - Monitor prn medication needs  - Set reasonable/therapeutic limits, outline behavioral expectations, and consequences   - Provide a non-threatening milieu, utilizing the least restrictive interventions   Outcome: Progressing     Problem: DISCHARGE PLANNING  Goal: Discharge to home or other facility with appropriate resources  Description: INTERVENTIONS:  - Identify barriers to discharge w/patient and caregiver  - Arrange for needed discharge resources and transportation as appropriate  - Identify discharge learning needs (meds, wound care, etc )  - Arrange for interpretive services to assist at discharge as needed  - Refer to Case Management Department for coordinating discharge planning if the patient needs post-hospital services based on physician/advanced practitioner order or complex needs related to functional status, cognitive ability, or social support system  Outcome: Progressing     Problem: Nutrition/Hydration-ADULT  Goal: Nutrient/Hydration intake appropriate for improving, restoring or maintaining nutritional needs  Description: Monitor and assess patient's nutrition/hydration status for malnutrition  Collaborate with interdisciplinary team and initiate plan and interventions as ordered  Monitor patient's weight and dietary intake as ordered or per policy  Utilize nutrition screening tool and intervene as necessary  Determine patient's food preferences and provide high-protein, high-caloric foods as appropriate       INTERVENTIONS:  - Monitor oral intake, urinary output, labs, and treatment plans  - Assess nutrition and hydration status and recommend course of action  - Evaluate amount of meals eaten  - Assist patient with eating if necessary   - Allow adequate time for meals  - Recommend/ encourage appropriate diets, oral nutritional supplements, and vitamin/mineral supplements  - Order, calculate, and assess calorie counts as needed  - Recommend, monitor, and adjust tube feedings and TPN/PPN based on assessed needs  - Assess need for intravenous fluids  - Provide specific nutrition/hydration education as appropriate  - Include patient/family/caregiver in decisions related to nutrition  Outcome: Progressing

## 2021-09-25 NOTE — PLAN OF CARE
Problem: Ineffective Coping  Goal: Identifies ineffective coping skills  Outcome: Progressing     Problem: Risk for Self Injury/Neglect  Goal: Treatment Goal: Remain safe during length of stay, learn and adopt new coping skills, and be free of self-injurious ideation, impulses and acts at the time of discharge  Outcome: Progressing     Problem: Depression  Goal: Verbalize thoughts and feelings  Description: Interventions:  - Assess and re-assess patient's level of risk   - Engage patient in 1:1 interactions, daily, for a minimum of 15 minutes   - Encourage patient to express feelings, fears, frustrations, hopes   Outcome: Progressing

## 2021-09-25 NOTE — NURSING NOTE
Patient was seen talking on the phone in his room  Patient then approached this RN, visibly upset,  asking for another Nicotine patch and his PRN sleep medication  Explained to the patient that he has the 21 g Nicotine patch and 4 mg Nicorette gum  Also explained to patient that typically the PRN sleep medication is given after 8 pm  When asked about the phone conversation and how he now appeared upset,  patient stated, "I don't want to talk about it"  Patient given a 1 mg PRN Ativan PO at 1833  Then received call from patient's mother stating that patient would like more opportunities to talk to people  Reassured mother that patient would have the opportunity to speak with a therapist and that nursing staff has been talking to the patient

## 2021-09-26 ENCOUNTER — APPOINTMENT (INPATIENT)
Dept: RADIOLOGY | Facility: HOSPITAL | Age: 26
DRG: 885 | End: 2021-09-26
Payer: COMMERCIAL

## 2021-09-26 PROBLEM — G89.29 CHRONIC PAIN IN RIGHT FOOT: Status: ACTIVE | Noted: 2021-09-26

## 2021-09-26 PROBLEM — M79.671 CHRONIC PAIN IN RIGHT FOOT: Status: ACTIVE | Noted: 2021-09-26

## 2021-09-26 PROCEDURE — 73630 X-RAY EXAM OF FOOT: CPT

## 2021-09-26 PROCEDURE — 99232 SBSQ HOSP IP/OBS MODERATE 35: CPT | Performed by: STUDENT IN AN ORGANIZED HEALTH CARE EDUCATION/TRAINING PROGRAM

## 2021-09-26 PROCEDURE — 99232 SBSQ HOSP IP/OBS MODERATE 35: CPT | Performed by: PHYSICIAN ASSISTANT

## 2021-09-26 RX ORDER — LANOLIN ALCOHOL/MO/W.PET/CERES
3 CREAM (GRAM) TOPICAL
Status: DISCONTINUED | OUTPATIENT
Start: 2021-09-26 | End: 2021-09-27 | Stop reason: HOSPADM

## 2021-09-26 RX ADMIN — LORAZEPAM 1 MG: 1 TABLET ORAL at 03:54

## 2021-09-26 RX ADMIN — NICOTINE POLACRILEX 4 MG: 4 GUM, CHEWING ORAL at 11:53

## 2021-09-26 RX ADMIN — DEXTROAMPHETAMINE SACCHARATE, AMPHETAMINE ASPARTATE, DEXTROAMPHETAMINE SULFATE AND AMPHETAMINE SULFATE 15 MG: 2.5; 2.5; 2.5; 2.5 TABLET ORAL at 08:53

## 2021-09-26 RX ADMIN — NICOTINE POLACRILEX 4 MG: 4 GUM, CHEWING ORAL at 08:54

## 2021-09-26 RX ADMIN — CHOLECALCIFEROL TAB 10 MCG (400 UNIT) 800 UNITS: 10 TAB at 08:53

## 2021-09-26 RX ADMIN — NICOTINE POLACRILEX 4 MG: 4 GUM, CHEWING ORAL at 21:08

## 2021-09-26 RX ADMIN — NICOTINE POLACRILEX 4 MG: 4 GUM, CHEWING ORAL at 15:06

## 2021-09-26 RX ADMIN — TRAZODONE HYDROCHLORIDE 50 MG: 50 TABLET ORAL at 20:16

## 2021-09-26 RX ADMIN — NICOTINE 21 MG: 21 PATCH, EXTENDED RELEASE TRANSDERMAL at 08:54

## 2021-09-26 RX ADMIN — ARIPIPRAZOLE 10 MG: 10 TABLET ORAL at 08:53

## 2021-09-26 RX ADMIN — NICOTINE POLACRILEX 4 MG: 4 GUM, CHEWING ORAL at 17:24

## 2021-09-26 RX ADMIN — NICOTINE POLACRILEX 4 MG: 4 GUM, CHEWING ORAL at 03:54

## 2021-09-26 RX ADMIN — MELATONIN TAB 3 MG 3 MG: 3 TAB at 21:08

## 2021-09-26 NOTE — NURSING NOTE
Patient labile and cooperative on approach denies s/s  Patient remains in his room seclude to himself out when prompted  Patient received Trazodone 50 mg PO PRN at 2021 with good results

## 2021-09-26 NOTE — PLAN OF CARE
Problem: Ineffective Coping  Goal: Cooperates with admission process  Description: Interventions:   - Complete admission process  Outcome: Progressing  Goal: Identifies ineffective coping skills  Outcome: Progressing  Goal: Identifies healthy coping skills  Outcome: Progressing     Problem: Risk for Self Injury/Neglect  Goal: Treatment Goal: Remain safe during length of stay, learn and adopt new coping skills, and be free of self-injurious ideation, impulses and acts at the time of discharge  Outcome: Progressing     Problem: Depression  Goal: Treatment Goal: Demonstrate behavioral control of depressive symptoms, verbalize feelings of improved mood/affect, and adopt new coping skills prior to discharge  Outcome: Progressing  Goal: Verbalize thoughts and feelings  Description: Interventions:  - Assess and re-assess patient's level of risk   - Engage patient in 1:1 interactions, daily, for a minimum of 15 minutes   - Encourage patient to express feelings, fears, frustrations, hopes   Outcome: Progressing     Problem: Anxiety  Goal: Anxiety is at manageable level  Description: Interventions:  - Assess and monitor patient's anxiety level  - Monitor for signs and symptoms (heart palpitations, chest pain, shortness of breath, headaches, nausea, feeling jumpy, restlessness, irritable, apprehensive)  - Collaborate with interdisciplinary team and initiate plan and interventions as ordered    - [place] patient to unit/surroundings  - Explain treatment plan  - Encourage participation in care  - Encourage verbalization of concerns/fears  - Identify coping mechanisms  - Assist in developing anxiety-reducing skills  - Administer/offer alternative therapies  - Limit or eliminate stimulants  Outcome: Progressing     Problem: Risk for Violence/Aggression Toward Others  Goal: Treatment Goal: Refrain from acts of violence/aggression during length of stay, and demonstrate improved impulse control at the time of discharge  Outcome: Progressing  Goal: Refrain from destructive acts on the environment or property  Outcome: Progressing  Goal: Control angry outbursts  Description: Interventions:  - Monitor patient closely, per order  - Ensure early verbal de-escalation  - Monitor prn medication needs  - Set reasonable/therapeutic limits, outline behavioral expectations, and consequences   - Provide a non-threatening milieu, utilizing the least restrictive interventions   Outcome: Progressing     Problem: DISCHARGE PLANNING  Goal: Discharge to home or other facility with appropriate resources  Description: INTERVENTIONS:  - Identify barriers to discharge w/patient and caregiver  - Arrange for needed discharge resources and transportation as appropriate  - Identify discharge learning needs (meds, wound care, etc )  - Arrange for interpretive services to assist at discharge as needed  - Refer to Case Management Department for coordinating discharge planning if the patient needs post-hospital services based on physician/advanced practitioner order or complex needs related to functional status, cognitive ability, or social support system  Outcome: Progressing     Problem: Nutrition/Hydration-ADULT  Goal: Nutrient/Hydration intake appropriate for improving, restoring or maintaining nutritional needs  Description: Monitor and assess patient's nutrition/hydration status for malnutrition  Collaborate with interdisciplinary team and initiate plan and interventions as ordered  Monitor patient's weight and dietary intake as ordered or per policy  Utilize nutrition screening tool and intervene as necessary  Determine patient's food preferences and provide high-protein, high-caloric foods as appropriate       INTERVENTIONS:  - Monitor oral intake, urinary output, labs, and treatment plans  - Assess nutrition and hydration status and recommend course of action  - Evaluate amount of meals eaten  - Assist patient with eating if necessary   - Allow adequate time for meals  - Recommend/ encourage appropriate diets, oral nutritional supplements, and vitamin/mineral supplements  - Order, calculate, and assess calorie counts as needed  - Recommend, monitor, and adjust tube feedings and TPN/PPN based on assessed needs  - Assess need for intravenous fluids  - Provide specific nutrition/hydration education as appropriate  - Include patient/family/caregiver in decisions related to nutrition  Outcome: Progressing

## 2021-09-26 NOTE — NURSING NOTE
In the morning, he was  isolative to his room with the exception of breakfast  After lunch, he was seen coloring and watching movies with his peers  He reported poor sleep with the PRN Trazodone and PRN Ativan  His mood remained labile and he was irritable in conversation  After speaking with the psychiatrist, he stated to this RN, "I better be able to get my Adderall when I leave  He better not have fucked with my meds"  Around 1400, patient had a phone call with his mother  Patient was heard telling his mother, "I'm just a lab rat" and "no one talks to me here"  He denied having any depression, anxiety, hallucinations or suicidal/homicidal thoughts  He was cooperative with taking his scheduled morning medications  Safety plan was reviewed with the patient and staff availability was reinforced

## 2021-09-26 NOTE — PROGRESS NOTES
Progress Note - [address] [facility] 26 y o  male MRN: [id number]  Unit/Bed#: [facility] 674-55 Encounter: [id number]    All documentation, nursing notes, labs, and vitals reviewed  The patient's medication reconciliation chart was also analyzed for medication adherence  I personally evaluated Sri Dick and discussed current care with treatment team  Last evening, Tiara Harden continued to struggle immensely with insomnia  He reports difficulty with both sleep initiation and sleep maintance  He states that this is a chronic issue but has become more problematic recently, in the context of worsening depression  I spoke at length today with Tiara Harden regarding his current medication regimen  He has been prescribed Adderall XR in the past with good benefit but this was unable to be continued during this hospitalization as it is non-formulary  Given limited need for Adderall while on the unit and likelihood the afternoon dose of Adderall is exacerbating his insomnia, will discontinue and only continue AM dose of 15mg  He was agreeable  Will continue PRN Trazodone which was started last evening and tolerated well  Will also add Melatonin 3mg QHS which was beneficial in the past  Acutely, Tiara Harden remains dysphoric and withdrawn but vehemently denies lethality concern  He denies active thoughts of suicide or self-harm  He has no plans to harm others  Tiara Harden is future-oriented, detailing plans to obtain unionized employment upon discharge  He states that discharge is urgent as he has classes [id number] of this week for "certifications" regarding his employment  Tiara Harden reports fair appetite  His energy and motivation are at baseline  He is without crying spells  He is exercising while on the unit, suggestive of self-preservation  Tiara Harden currently denies symptomatology suggestive of pathologic anxiety  He is not restless or visibly perturbed during today's examination    Tiara Harden currently denies symptomatology suggestive of kandice/hypomania  He is not grandiose, pressured in speech, labile, or on edge throughout today's assessment  He continues to deny per acute perceptual anomalies, such as auditory/visual hallucinations, paranoia, referential ideation, or delusional beliefs  He offers no further complaints  Mental Status Evaluation:    Appearance:  age appropriate, casually dressed, dressed appropriately   Behavior:  pleasant, cooperative, calm   Speech:  normal rate, normal volume, normal pitch, fluent   Mood:  dysphoric   Affect:  constricted   Thought Process:  organized, logical, coherent, goal directed   Associations: intact associations   Thought Content:  no overt delusions   Perceptual Disturbances: no auditory hallucinations, no visual hallucinations   Risk Potential: Suicidal ideation - None at present  Homicidal ideation - None at present  Potential for aggression - No   Sensorium:  oriented to person, place and time/date   Memory:  recent and remote memory grossly intact   Consciousness:  alert and awake    Attention: attention span and concentration are age appropriate   Insight:  fair and improving   Judgment: fair and improving   Gait/Station: normal gait/station   Motor Activity: no abnormal movements       Assessment:     Principal Problem:    Bipolar disorder with severe depression (HCC)  Active Problems:    Class 1 obesity due to excess calories without serious comorbidity with body mass index (BMI) of 30 0 to 30 9 in adult    Medical clearance for psychiatric admission    COVID-19    Episode of recurrent major depressive disorder ([facility] [address] )    PTSD (post-traumatic stress disorder)    ADHD    Tobacco use      Plan/Recommended Treatment:     - Continue with pharmacotherapy, group therapy, milieu therapy and occupational therapy      - Risks/benefits/alternatives to treatment discussed and Laura Ramos continues to verbalize understanding   - Discontinue afternoon dose of Adderall 15mg BID as this is likely contributing to his insomnia  - Start Adderall 15mg AM ONLY  - Start Melatonin 3mg QHS for sleep  - Continue PRN Trazodone 50mg QHS for sleep  - Abilify changed from PM to AM dosing as this agent can be activating and likely contributes to worsening of insomnia   - Will consider further optimization of psychotropic medication regimen as hospital course progresses   - Continue to assess for adverse medication side effects   - Encourage Nae Blank to participate in nonverbal forms of therapy including journaling and art/music therapy  - Continue precautionary Q7-minute safety checks  - Continue to engage case management/SW to assist with collateral information, discharge planning, and the implementation of an individualized, patient-centered plan of care    - The patient will be maintained on the following medications:    Current Facility-Administered Medications   Medication Dose Route Frequency Provider Last Rate    acetaminophen  650 mg Oral Q4H PRN Juanita Lane MD      acetaminophen  650 mg Oral Q4H PRN Juanita Lane MD      acetaminophen  975 mg Oral Q6H PRN Juanita Lane MD      [START ON 9/27/2021] amphetamine-dextroamphetamine  15 mg Oral Daily Isaias Campbell MD      ARIPiprazole  10 mg Oral Daily Isaias Campbell MD      benztropine  0 5 mg Oral Q4H PRN Max 6/day Juanita Lane MD      cholecalciferol  800 Units Oral Daily Selena Vazquez PA-C      hydrOXYzine HCL  25 mg Oral Q6H PRN Max 4/day Juanita Lane MD      LORazepam  1 mg Intramuscular Q6H PRN Max 3/day Juanita Lane MD      LORazepam  0 5 mg Oral Q6H PRN Max 4/day Juanita Lane MD      LORazepam  1 mg Oral Q6H PRN Max 3/day Juanita Lane MD      nicotine  21 mg Transdermal Daily Kayla Lau PA-C      nicotine polacrilex  4 mg Oral Q2H PRN Kayla Lau PA-C      OLANZapine  5 mg Intramuscular Q3H PRN Max 3/day MD Prosper Saldivar OLANZapine  2 5 mg Oral Q4H PRN Max 6/day Ricki Mtz MD      OLANZapine  5 mg Oral Q4H PRN Max 3/day Ricki Mtz MD      OLANZapine  5 mg Oral Q3H PRN Max 3/day Ricki Mtz MD      traZODone  50 mg Oral HS PRN Donte Rucker MD

## 2021-09-26 NOTE — PLAN OF CARE
Problem: Depression  Goal: Verbalize thoughts and feelings  Description: Interventions:  - Assess and re-assess patient's level of risk   - Engage patient in 1:1 interactions, daily, for a minimum of 15 minutes   - Encourage patient to express feelings, fears, frustrations, hopes   Outcome: Progressing     Problem: Anxiety  Goal: Anxiety is at manageable level  Description: Interventions:  - Assess and monitor patient's anxiety level  - Monitor for signs and symptoms (heart palpitations, chest pain, shortness of breath, headaches, nausea, feeling jumpy, restlessness, irritable, apprehensive)  - Collaborate with interdisciplinary team and initiate plan and interventions as ordered    - [place] patient to unit/surroundings  - Explain treatment plan  - Encourage participation in care  - Encourage verbalization of concerns/fears  - Identify coping mechanisms  - Assist in developing anxiety-reducing skills  - Administer/offer alternative therapies  - Limit or eliminate stimulants  Outcome: Progressing     Problem: Risk for Violence/Aggression Toward Others  Goal: Control angry outbursts  Description: Interventions:  - Monitor patient closely, per order  - Ensure early verbal de-escalation  - Monitor prn medication needs  - Set reasonable/therapeutic limits, outline behavioral expectations, and consequences   - Provide a non-threatening milieu, utilizing the least restrictive interventions   Outcome: Progressing

## 2021-09-27 VITALS
HEART RATE: 95 BPM | DIASTOLIC BLOOD PRESSURE: 83 MMHG | WEIGHT: 218 LBS | RESPIRATION RATE: 18 BRPM | HEIGHT: 71 IN | SYSTOLIC BLOOD PRESSURE: 145 MMHG | OXYGEN SATURATION: 96 % | BODY MASS INDEX: 30.52 KG/M2 | TEMPERATURE: 97.7 F

## 2021-09-27 PROBLEM — M25.571 TOE JOINT PAIN, RIGHT: Status: ACTIVE | Noted: 2021-09-26

## 2021-09-27 LAB
HIV 1+2 AB+HIV1 P24 AG SERPL QL IA: NORMAL
RPR SER QL: NORMAL

## 2021-09-27 PROCEDURE — 99238 HOSP IP/OBS DSCHRG MGMT 30/<: CPT | Performed by: PSYCHIATRY & NEUROLOGY

## 2021-09-27 RX ORDER — LANOLIN ALCOHOL/MO/W.PET/CERES
3 CREAM (GRAM) TOPICAL
Qty: 30 TABLET | Refills: 0 | Status: SHIPPED | OUTPATIENT
Start: 2021-09-27 | End: 2021-10-27

## 2021-09-27 RX ORDER — ARIPIPRAZOLE 10 MG/1
10 TABLET ORAL DAILY
Qty: 30 TABLET | Refills: 0 | Status: SHIPPED | OUTPATIENT
Start: 2021-09-28 | End: 2021-10-28

## 2021-09-27 RX ORDER — OMEGA-3S/DHA/EPA/FISH OIL/D3 300MG-1000
800 CAPSULE ORAL DAILY
Qty: 60 TABLET | Refills: 0 | Status: SHIPPED | OUTPATIENT
Start: 2021-09-28 | End: 2021-10-28

## 2021-09-27 RX ADMIN — NICOTINE POLACRILEX 4 MG: 4 GUM, CHEWING ORAL at 11:29

## 2021-09-27 RX ADMIN — DEXTROAMPHETAMINE SACCHARATE, AMPHETAMINE ASPARTATE, DEXTROAMPHETAMINE SULFATE AND AMPHETAMINE SULFATE 15 MG: 2.5; 2.5; 2.5; 2.5 TABLET ORAL at 08:35

## 2021-09-27 RX ADMIN — ARIPIPRAZOLE 10 MG: 10 TABLET ORAL at 08:35

## 2021-09-27 RX ADMIN — NICOTINE POLACRILEX 4 MG: 4 GUM, CHEWING ORAL at 11:31

## 2021-09-27 RX ADMIN — CHOLECALCIFEROL TAB 10 MCG (400 UNIT) 800 UNITS: 10 TAB at 08:34

## 2021-09-27 RX ADMIN — NICOTINE POLACRILEX 4 MG: 4 GUM, CHEWING ORAL at 08:33

## 2021-09-27 RX ADMIN — NICOTINE 21 MG: 21 PATCH, EXTENDED RELEASE TRANSDERMAL at 08:35

## 2021-09-27 RX ADMIN — NICOTINE POLACRILEX 4 MG: 4 GUM, CHEWING ORAL at 14:19

## 2021-09-27 NOTE — QUICK NOTE
Patient was not physically seen or examined however thorough chart review was performed including review of vitals and labs  Toe joint pain, right  Assessment & Plan  · Patient reports ongoing intermittent dull pain of right 1st MTP joint x 2 months since tripping of patio and twisting foot  · Denies injury/acute pain elsewhere, denies numbness/tingling  · States he did not undergo assessment of injury following incident because he "thought it would go away"  · Denies pain at rest, reports mild (2/10) pain with weightbearing, 5/10 pain with ambulation and other activities involving flexion of forefoot  · On exam:  Fading ecchymosis noted of right 1st MTP joint, minimal joint swelling appreciated, TTP of right 1st MTP      · Neurovascularly intact, FROM  · XR right foot: No acute osseous abnormality  · Recommend outpatient podiatry follow-up given chronicity  · Tylenol PRN pain

## 2021-09-27 NOTE — PROGRESS NOTES
[facility]  Progress Note - Janel Natalee 1995, 26 y o  male MRN: [id number]  Unit/Bed#: Yue Zuniga 729-98 Encounter: [id number]  Primary Care Provider: Joe Quan DO   Date and time admitted to hospital: 9/23/2021 10:29 PM    Toe joint pain, right  Assessment & Plan  · Patient reports ongoing intermittent dull pain of right 1st MTP joint x 2 months since tripping of patio and twisting foot  · Denies injury/acute pain elsewhere, denies numbness/tingling  · States he did not undergo assessment of injury following incident because he "thought it would go away"  · Denies pain at rest, reports mild (2/10) pain with weightbearing, 5/10 pain with ambulation and other activities involving flexion of forefoot  · On exam:  Fading ecchymosis noted of right 1st MTP joint, minimal joint swelling appreciated, TTP of right 1st MTP  · Neurovascularly intact, FROM  · Will order XR right foot to assess for fracture  · Consider podiatry consult depending on results of XR   · Recommend outpatient podiatry follow-up given chronicity  · Tylenol PRN pain    Nurse Coordination of Care Discussion:  Discussed with treatment team JANICE Roa    Discussions with Specialists or Other Care Team Provider:  None    Education and Discussions with Family / Patient:  Discussed current plan with patient, answered all questions to best of my ability    Time Spent for Care: 20 minutes  More than 50% of total time spent on counseling and coordination of care as described above  Current Length of Stay: 4 day(s)    Code Status: Level 1 - Full Code      Subjective:   Notified by nursing of patient complaint of right foot pain since fall at home prior to admission; per nursing, patient requesting x-ray at this time  During encounter, patient reports that he tripped off of his patio a couple months ago and twisted his foot, stating that he has had dull intermittent pain of right 1st MTP joint since    Notes that he did not seek medical attention at the time of initial injury, stating he "figured it would just get better with time "  Denies any other injuries to foot since this incident  Denies acute worsening of pain as of late, states the pain is "just not getting better" and he is subsequently concerned for possible fracture of joint  Denies pain at rest, reports mild 2/10 pain with weight-bearing, 5/10 pain with ambulation  Notes that activities that involve flexion of forefoot (particularly 1st MTP joint) exacerbates pain  Denies acute pain elsewhere, denies numbness/tingling  Patient encouraged to follow-up with a podiatrist outpatient given chronicity of complaints, patient agreeable to plan  Objective:     Vitals:   Temp (24hrs), Av 2 °F (36 2 °C), Min:96 9 °F (36 1 °C), Max:97 7 °F (36 5 °C)    Temp:  [96 9 °F (36 1 °C)-97 7 °F (36 5 °C)] 97 1 °F (36 2 °C)  HR:  [] 100  Resp:  [16-20] 16  BP: (130-161)/(79-98) 158/98  SpO2:  [94 %-97 %] 95 %  Body mass index is 30 4 kg/m²  Physical Exam:     Physical Exam  Constitutional:       General: He is not in acute distress  Appearance: Normal appearance  He is not ill-appearing or diaphoretic  Eyes:      Extraocular Movements: Extraocular movements intact  Cardiovascular:      Pulses: Normal pulses  Pulmonary:      Effort: Pulmonary effort is normal  No respiratory distress  Musculoskeletal:         General: Normal range of motion  Right lower leg: No edema  Left lower leg: No edema  Right ankle: No swelling or deformity  No tenderness  Normal range of motion  Anterior drawer test negative  Normal pulse  Right Achilles Tendon: No tenderness  Left ankle: No swelling or deformity  No tenderness  Normal range of motion  Anterior drawer test negative  Normal pulse  Left Achilles Tendon: No tenderness  Right foot: Normal range of motion and normal capillary refill   Swelling (minimal swelling right 1st MTP; fading ecchymosis of joint) and bony tenderness (right 1st MTP joint; no tenderness of MTP joints 2-5, no midfoot or calcaneal tenderness) present  No deformity  Normal pulse  Left foot: Normal range of motion and normal capillary refill  No swelling, deformity or bony tenderness  Normal pulse  Feet:    Feet:      Right foot:      Skin integrity: Skin integrity normal       Left foot:      Skin integrity: Skin integrity normal    Skin:     Capillary Refill: Capillary refill takes less than 2 seconds  Neurological:      General: No focal deficit present  Mental Status: He is alert  Psychiatric:         Attention and Perception: Attention normal          Mood and Affect: Affect is blunt  Speech: Speech normal          Behavior: Behavior is cooperative  Additional Data:     Labs:    Results from last 7 days   Lab Units 09/24/21  0644   WBC Thousand/uL 5 50   HEMOGLOBIN g/dL 15 1   HEMATOCRIT % 45 0   PLATELETS Thousands/uL 218   NEUTROS PCT % 50   LYMPHS PCT % 39   MONOS PCT % 9   EOS PCT % 2     Results from last 7 days   Lab Units 09/24/21  0644   SODIUM mmol/L 140   POTASSIUM mmol/L 4 1   CHLORIDE mmol/L 104   CO2 mmol/L 27   BUN mg/dL 15   CREATININE mg/dL 0 84   ANION GAP mmol/L 9   CALCIUM mg/dL 9 8   ALBUMIN g/dL 4 3   TOTAL BILIRUBIN mg/dL 0 69   ALK PHOS U/L 48   ALT U/L 20   AST U/L 23   GLUCOSE RANDOM mg/dL 90                     * I Have Reviewed All Lab Data Listed Above  * Additional Pertinent Lab Tests Reviewed:  All Labs Within Last 24 Hours Reviewed    Imaging:    Imaging Reports Reviewed Today Include: none; XR right foot pending      Last 24 Hours Medication List:   Current Facility-Administered Medications   Medication Dose Route Frequency Provider Last Rate    acetaminophen  650 mg Oral Q4H PRN Mis Jacinto MD      acetaminophen  650 mg Oral Q4H PRN Mis Jacinto MD      acetaminophen  975 mg Oral Q6H PRN MD Valerie Anthony amphetamine-dextroamphetamine  15 mg Oral Daily Gricel Hyman MD      ARIPiprazole  10 mg Oral Daily Gricel Hyman MD      benztropine  0 5 mg Oral Q4H PRN Max 6/day Bebo MD Jerrica      cholecalciferol  800 Units Oral Daily Selena DefranciscoRONNIE      hydrOXYzine HCL  25 mg Oral Q6H PRN Max 4/day Bebo Becerril MD      LORazepam  1 mg Intramuscular Q6H PRN Max 3/day Bebo MD Jerrica      LORazepam  0 5 mg Oral Q6H PRN Max 4/day Bebo MD Jerrica      LORazepam  1 mg Oral Q6H PRN Max 3/day Bebo Jerrica, MD      melatonin  3 mg Oral HS Gricel Hyman MD      nicotine  21 mg Transdermal Daily Stacia Khalil PA-C      nicotine polacrilex  4 mg Oral Q2H PRN Stacia Khalil PA-C      OLANZapine  5 mg Intramuscular Q3H PRN Max 3/day Bebo Becerril MD      OLANZapine  2 5 mg Oral Q4H PRN Max 6/day Bebo MD Jerrica      OLANZapine  5 mg Oral Q4H PRN Max 3/day Bebomoustapha Becerril MD      OLANZapine  5 mg Oral Q3H PRN Max 3/day Bebomoustapha Becerril MD      traZODone  50 mg Oral HS PRN Gricel Hyman MD          Today, Patient Was Seen By: Bernadine Solo PA-C      ** Please Note: Dictation voice to text software may have been used in the creation of this document   **

## 2021-09-27 NOTE — ASSESSMENT & PLAN NOTE
· Patient reports ongoing intermittent dull pain of right 1st MTP joint x 2 months since tripping of patio and twisting foot  · Denies injury/acute pain elsewhere, denies numbness/tingling  · States he did not undergo assessment of injury following incident because he "thought it would go away"  · Denies pain at rest, reports mild (2/10) pain with weightbearing, 5/10 pain with ambulation and other activities involving flexion of forefoot  · On exam:  Fading ecchymosis noted of right 1st MTP joint, minimal joint swelling appreciated, TTP of right 1st MTP      · Neurovascularly intact, FROM  · Will order XR right foot to assess for fracture  · Consider podiatry consult depending on results of XR   · Recommend outpatient podiatry follow-up given chronicity  · Tylenol PRN pain

## 2021-09-27 NOTE — PLAN OF CARE
Problem: DISCHARGE PLANNING  Goal: Discharge to home or other facility with appropriate resources  Description: INTERVENTIONS:  - Identify barriers to discharge w/patient and caregiver  - Arrange for needed discharge resources and transportation as appropriate  - Identify discharge learning needs (meds, wound care, etc )  - Arrange for interpretive services to assist at discharge as needed  - Refer to Case Management Department for coordinating discharge planning if the patient needs post-hospital services based on physician/advanced practitioner order or complex needs related to functional status, cognitive ability, or social support system  Outcome: Completed     Discharge planning discussed with pt and pt's mother  OP psych intake appt scheduled  Pt reports that he has an upcoming appt scheduled with PCP  Covid quarantine recommendation provided  Med scripts sent to preferred pharmacy ([facility] in [place])   Transportation provided by pt's mother --  btw 2:30-3pm

## 2021-09-27 NOTE — PLAN OF CARE
Problem: Ineffective Coping  Goal: Cooperates with admission process  Description: Interventions:   - Complete admission process  Outcome: Completed  Goal: Identifies ineffective coping skills  Outcome: Completed  Goal: Identifies healthy coping skills  Outcome: Completed     Problem: Risk for Self Injury/Neglect  Goal: Treatment Goal: Remain safe during length of stay, learn and adopt new coping skills, and be free of self-injurious ideation, impulses and acts at the time of discharge  Outcome: Completed     Problem: Depression  Goal: Treatment Goal: Demonstrate behavioral control of depressive symptoms, verbalize feelings of improved mood/affect, and adopt new coping skills prior to discharge  Outcome: Completed  Goal: Verbalize thoughts and feelings  Description: Interventions:  - Assess and re-assess patient's level of risk   - Engage patient in 1:1 interactions, daily, for a minimum of 15 minutes   - Encourage patient to express feelings, fears, frustrations, hopes   Outcome: Completed     Problem: Anxiety  Goal: Anxiety is at manageable level  Description: Interventions:  - Assess and monitor patient's anxiety level  - Monitor for signs and symptoms (heart palpitations, chest pain, shortness of breath, headaches, nausea, feeling jumpy, restlessness, irritable, apprehensive)  - Collaborate with interdisciplinary team and initiate plan and interventions as ordered    - [place] patient to unit/surroundings  - Explain treatment plan  - Encourage participation in care  - Encourage verbalization of concerns/fears  - Identify coping mechanisms  - Assist in developing anxiety-reducing skills  - Administer/offer alternative therapies  - Limit or eliminate stimulants  Outcome: Completed     Problem: Risk for Violence/Aggression Toward Others  Goal: Treatment Goal: Refrain from acts of violence/aggression during length of stay, and demonstrate improved impulse control at the time of discharge  Outcome: Completed  Goal: Refrain from destructive acts on the environment or property  Outcome: Completed  Goal: Control angry outbursts  Description: Interventions:  - Monitor patient closely, per order  - Ensure early verbal de-escalation  - Monitor prn medication needs  - Set reasonable/therapeutic limits, outline behavioral expectations, and consequences   - Provide a non-threatening milieu, utilizing the least restrictive interventions   Outcome: Completed     Problem: Nutrition/Hydration-ADULT  Goal: Nutrient/Hydration intake appropriate for improving, restoring or maintaining nutritional needs  Description: Monitor and assess patient's nutrition/hydration status for malnutrition  Collaborate with interdisciplinary team and initiate plan and interventions as ordered  Monitor patient's weight and dietary intake as ordered or per policy  Utilize nutrition screening tool and intervene as necessary  Determine patient's food preferences and provide high-protein, high-caloric foods as appropriate  INTERVENTIONS:  - Monitor oral intake, urinary output, labs, and treatment plans  - Assess nutrition and hydration status and recommend course of action  - Evaluate amount of meals eaten  - Assist patient with eating if necessary   - Allow adequate time for meals  - Recommend/ encourage appropriate diets, oral nutritional supplements, and vitamin/mineral supplements  - Order, calculate, and assess calorie counts as needed  - Recommend, monitor, and adjust tube feedings and TPN/PPN based on assessed needs  - Assess need for intravenous fluids  - Provide specific nutrition/hydration education as appropriate  - Include patient/family/caregiver in decisions related to nutrition  Outcome: Completed     Problem: Nutrition/Hydration-ADULT  Goal: Nutrient/Hydration intake appropriate for improving, restoring or maintaining nutritional needs  Description: Monitor and assess patient's nutrition/hydration status for malnutrition  Collaborate with interdisciplinary team and initiate plan and interventions as ordered  Monitor patient's weight and dietary intake as ordered or per policy  Utilize nutrition screening tool and intervene as necessary  Determine patient's food preferences and provide high-protein, high-caloric foods as appropriate       INTERVENTIONS:  - Monitor oral intake, urinary output, labs, and treatment plans  - Assess nutrition and hydration status and recommend course of action  - Evaluate amount of meals eaten  - Assist patient with eating if necessary   - Allow adequate time for meals  - Recommend/ encourage appropriate diets, oral nutritional supplements, and vitamin/mineral supplements  - Order, calculate, and assess calorie counts as needed  - Recommend, monitor, and adjust tube feedings and TPN/PPN based on assessed needs  - Assess need for intravenous fluids  - Provide specific nutrition/hydration education as appropriate  - Include patient/family/caregiver in decisions related to nutrition  Outcome: Completed

## 2021-09-27 NOTE — DISCHARGE INSTR - OTHER ORDERS
You are being discharged to: [facility], [address]     Triggers you have identified during your hospitalization that led to your admission include: relationship and financial stress  Coping skills you have identified during your hospitalization include: spending time outside, fishing, hiking  If you are unable to deal with your distressed mood alone, please contact [facility]  If that is not effective and you continue to have suicidal ideation, a distressed mood, or feel like you're in crisis, please contact 911 and go to the nearest emergency center  BE[facility]AVIORAL [facility]IC[facility] AT [facility] Crisis Intervention: [phone] or 9-369.953.9412  *National Suicide Prevention Lifeline:  0-433.560.1947  *Alcohol Anonymous: 971.928.6056  *[place] Drug & Alcohol Commission: (743) 793-9045  [address] [facility]  on [address] [facility] ([facility]) HELPLINE: 245.724.4479/Website: www [employer] org  *Substance Abuse and [address] [facility]) [employer], which is a confidential, free, 24-hour-a-day, 365-day-a-year, information service for individuals and family members facing mental health and/or substance use disorders  This service provides referrals to local treatment facilities, support groups, and community-based organizations  Callers can also order free publications and other information  Call 8-715.282.9351/Website: www [facility] gov  *[facility] 2-1-1: This is a toll free, confidential, 24-hour-a-day service which connects you to a community  in your area who can help you find services and resources that are available to you locally and provide critical services that can improve and save lives  Call: 211  /Website: https://kcPinsbrady net/         Per CDC guidelines, you should complete a 10-day quarantine after Covid+ test result  Your quarantine period will be over on 10/2

## 2021-09-27 NOTE — NURSING NOTE
Patient is visible on unit, out in mendez social with peers  Pt reports poor sleep due to staff waking him, states "She literally woke me constantly and staring at me and knocking on door, told me every 7 minutes "  Pt also states "I need to get out of here, they're crack heads here offering to come to my house and sex, I don't belong here "  Pt is cooperative with care and medication compliant

## 2021-09-27 NOTE — NURSING NOTE
Patient is in agreement with discharge  AVS/DC instructions reviewed and given to pt  Pt denies all S/S

## 2021-09-27 NOTE — DISCHARGE INSTRUCTIONS
Bipolar Disorder   WHAT YOU NEED TO KNOW:   Bipolar disorder is a long-term chemical imbalance that causes rapid changes in mood and behavior  High moods are called kandice  Low moods are called depression  Sometimes you will feel manic and sometimes you will feel depressed  You can have alternating episodes of kandice and depression  This is called a mixed bipolar state  DISCHARGE INSTRUCTIONS:   Call 911 if:   · You think about hurting yourself or someone else  Contact your healthcare provider or psychiatrist if:   · You are having trouble managing your bipolar disorder  · You cannot sleep, or are sleeping all the time  · You cannot eat, or are eating more than usual     · You feel dizzy or your stomach is upset  · You cannot make it to your next meeting  · You have questions or concerns about your condition or care  Medicines:   · Medicines  may be given to help keep your mood stable, or to help you sleep  Changes in medicine are often needed as your bipolar disorder changes  · Take your medicine as directed  Contact your healthcare provider if you think your medicine is not helping or if you have side effects  Tell him or her if you are allergic to any medicine  Keep a list of the medicines, vitamins, and herbs you take  Include the amounts, and when and why you take them  Bring the list or the pill bottles to follow-up visits  Carry your medicine list with you in case of an emergency  Follow up with your healthcare provider or psychiatrist as directed:  Write down your questions so you remember to ask them during your visits  Manage bipolar disorder:  Watch for triggers of bipolar disorder symptoms, such as stress  Learn new ways to relax, such as deep breathing, to manage your stress  Tell someone if you feel a manic or depressive period might be coming on  Ask a friend or family member to help watch you for bipolar symptoms   Work to develop skills that will help you manage bipolar disorder  You may need to make lifestyle changes  Ask your healthcare provider or psychiatrist for resources  For support and more information:   · [address] ([address]), Office of Public Service [place] [employer], Planning, and Communications  [address], [address] [address], [address] [place], [place] [address]   Phone: [phone]  Phone: [phone]  Web Address: Annie tn    · Depression and [address] ([address])  [address] [facility], [address] [place] [address] , [facility]  Phone: [phone]  Web Address: [facility] no  [address] [place] [address] 2021 Information is for End User's use only and may not be sold, redistributed or otherwise used for commercial purposes  All illustrations and images included in CareNotes® are the copyrighted property of [employer]  or [address] [place] [address]  The above information is an  only  It is not intended as medical advice for individual conditions or treatments  Talk to your doctor, nurse or pharmacist before following any medical regimen to see if it is safe and effective for you

## 2021-09-27 NOTE — ASSESSMENT & PLAN NOTE
· Patient reports ongoing intermittent dull pain of right 1st MTP joint x 2 months since tripping of patio and twisting foot  · Denies injury/acute pain elsewhere, denies numbness/tingling  · States he did not undergo assessment of injury following incident because he "thought it would go away"  · Denies pain at rest, reports mild (2/10) pain with weightbearing, 5/10 pain with ambulation and other activities involving flexion of forefoot  · On exam:  Fading ecchymosis noted of right 1st MTP joint, minimal joint swelling appreciated, TTP of right 1st MTP      · Neurovascularly intact, FROM  · XR right foot: No acute osseous abnormality  · Recommend outpatient podiatry follow-up given chronicity  · Tylenol PRN pain

## 2021-09-27 NOTE — BH TRANSITION RECORD
Contact Information: If you have any questions, concerns, pended studies, tests and/or procedures, or emergencies regarding your inpatient behavioral health visit  Please contact [facility] older adult behavioral health unit 6T [phone] and ask to speak to a , nurse or physician  A contact is available 24 hours/ 7 days a week at this number  Summary of Procedures Performed During your Stay:  Below is a list of major procedures performed during your hospital stay and a summary of results:  - Major Imaging Studies: XR foot 3+ vw right  Pending Studies (From admission, onward)     Start     Ordered    09/25/21 1129  Chlamydia/GC amplified DNA by PCR  Once     Question Answer Comment   What is the Specimen Type? Urine    What is the Specimen Source? Urine, Other    Release to patient through [facility] Immediate        09/25/21 1128    09/25/21 1128  HIV 1/2 ANTIGEN/ANTIBODY (4TH GENERATION) W REFLEX [facility]  Once     Question:  Release to patient through [facility]  Answer:  Immediate    09/25/21 1127              If studies are pending at discharge, follow up with your PCP and/or referring provider

## 2021-09-27 NOTE — CASE MANAGEMENT
Spoke with pt regarding OP services  Pt is interested and agreeable  Ideally, he'd like to find a provider in the [place], [facility], [address] area  However, if those are not options, he is agreeable to a provider in the [facility] area  Spoke with pt's mother, [facility] [phone], to give update  [facility] states that she spoke with pt yesterday and they had a good conversation  Pt was joking a little and asking about his house, cat, and grandparents  [facility] noticed improvement in pt's overall mood  Pt expressed some concern to [facility] about his commitment status as someone on the unit told pt that he was here involuntarily and his firearms would be taken away  CM provided clarification and reassurance  Additionally, [facility] confirmed that 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, 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

## 2021-09-27 NOTE — CASE MANAGEMENT
09/27/21 0846   Team Meeting   Meeting Type Daily Rounds   Initial Conference Date 09/27/21   Team Members Present   Team Members Present Physician;Nurse;;Occupational Therapist   Physician Team Member Dr Jake Amador Team Member [facility] Management Team Member Clari Gonzalez   OT Team Member Cherylene Curd   Patient/Family Present   Patient Present No   Patient's Family Present No     LCD = 9/27, denies s/s, isolative at times, requesting d/c, med compliant, difficulty sleeping, received Trazadone -- ineffective, paranoid last night, xray of R foot ordered

## 2021-09-27 NOTE — DISCHARGE INSTR - APPOINTMENTS
Carmen Ojeda RN, our [employer], will be calling you after your discharge, on the phone number that you provided  She will be available as an additional support, if needed  If you wish to speak with her, you may contact Olga Polanco at [phone]

## 2021-09-27 NOTE — CASE MANAGEMENT
Pt will be discharged today  Pt made aware  Pick-up time TBD  Med scripts should be sent to [facility] in [place]  Pt reports that he already has an upcoming PCP appt scheduled  Pt is aware of OP psych intake appt next week  Test results from today pending  Spoke with pt's mom, Brianna Vega, to make her aware   She will  pt today between 2:30-3pm
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
## 2021-09-27 NOTE — NURSING NOTE
Pt was seen pacing the mendez evening hours  Was also seen coloring for a while  Pt denied s/s  Trazodone  given per pt's request at 2016 was not effective as pt could not sleep until almost midnight  He got very scared when this writer came to his room for O2 check: " why are you here? I saw you 4 times already!" Pt was told the unit policy on every 7 mins pts' checks  Getting very irritable, pt refused to check his 0000 vitals

## 2021-09-28 LAB
C TRACH DNA SPEC QL NAA+PROBE: NEGATIVE
N GONORRHOEA DNA SPEC QL NAA+PROBE: NEGATIVE

## 2023-12-17 ENCOUNTER — HOSPITAL ENCOUNTER (EMERGENCY)
Facility: HOSPITAL | Age: 28
Discharge: HOME | End: 2023-12-17
Attending: EMERGENCY MEDICINE
Payer: COMMERCIAL

## 2023-12-17 VITALS
TEMPERATURE: 97.5 F | SYSTOLIC BLOOD PRESSURE: 182 MMHG | RESPIRATION RATE: 20 BRPM | DIASTOLIC BLOOD PRESSURE: 81 MMHG | HEIGHT: 71 IN | HEART RATE: 84 BPM | WEIGHT: 230 LBS | BODY MASS INDEX: 32.2 KG/M2 | OXYGEN SATURATION: 99 %

## 2023-12-17 DIAGNOSIS — R19.7 DIARRHEA OF PRESUMED INFECTIOUS ORIGIN: Primary | ICD-10-CM

## 2023-12-17 LAB
ALBUMIN SERPL BCP-MCNC: 4.6 G/DL (ref 3.5–5)
ALP SERPL-CCNC: 71 U/L (ref 34–104)
ALT SERPL W P-5'-P-CCNC: 21 U/L (ref 7–52)
ANION GAP SERPL CALCULATED.3IONS-SCNC: 6 MMOL/L
AST SERPL W P-5'-P-CCNC: 17 U/L (ref 13–39)
BASOPHILS # BLD AUTO: 0.03 THOUSANDS/ÂΜL (ref 0–0.1)
BASOPHILS NFR BLD AUTO: 0 % (ref 0–1)
BILIRUB SERPL-MCNC: 0.44 MG/DL (ref 0.2–1)
BUN SERPL-MCNC: 14 MG/DL (ref 5–25)
CALCIUM SERPL-MCNC: 9.4 MG/DL (ref 8.4–10.2)
CHLORIDE SERPL-SCNC: 106 MMOL/L (ref 96–108)
CO2 SERPL-SCNC: 27 MMOL/L (ref 21–32)
CREAT SERPL-MCNC: 1.05 MG/DL (ref 0.6–1.3)
EOSINOPHIL # BLD AUTO: 0.1 THOUSAND/ÂΜL (ref 0–0.61)
EOSINOPHIL NFR BLD AUTO: 1 % (ref 0–6)
ERYTHROCYTE [DISTWIDTH] IN BLOOD BY AUTOMATED COUNT: 12.4 % (ref 11.6–15.1)
GFR SERPL CREATININE-BSD FRML MDRD: 96 ML/MIN/1.73SQ M
GLUCOSE SERPL-MCNC: 99 MG/DL (ref 65–140)
HCT VFR BLD AUTO: 47.3 % (ref 36.5–49.3)
HGB BLD-MCNC: 15.8 G/DL (ref 12–17)
IMM GRANULOCYTES # BLD AUTO: 0.02 THOUSAND/UL (ref 0–0.2)
IMM GRANULOCYTES NFR BLD AUTO: 0 % (ref 0–2)
LYMPHOCYTES # BLD AUTO: 1.89 THOUSANDS/ÂΜL (ref 0.6–4.47)
LYMPHOCYTES NFR BLD AUTO: 22 % (ref 14–44)
MCH RBC QN AUTO: 29.1 PG (ref 26.8–34.3)
MCHC RBC AUTO-ENTMCNC: 33.4 G/DL (ref 31.4–37.4)
MCV RBC AUTO: 87 FL (ref 82–98)
MONOCYTES # BLD AUTO: 0.57 THOUSAND/ÂΜL (ref 0.17–1.22)
MONOCYTES NFR BLD AUTO: 7 % (ref 4–12)
NEUTROPHILS # BLD AUTO: 5.97 THOUSANDS/ÂΜL (ref 1.85–7.62)
NEUTS SEG NFR BLD AUTO: 70 % (ref 43–75)
NRBC BLD AUTO-RTO: 0 /100 WBCS
PLATELET # BLD AUTO: 313 THOUSANDS/UL (ref 149–390)
PMV BLD AUTO: 8.6 FL (ref 8.9–12.7)
POTASSIUM SERPL-SCNC: 4.1 MMOL/L (ref 3.5–5.3)
PROT SERPL-MCNC: 7.3 G/DL (ref 6.4–8.4)
RBC # BLD AUTO: 5.43 MILLION/UL (ref 3.88–5.62)
SODIUM SERPL-SCNC: 139 MMOL/L (ref 135–147)
WBC # BLD AUTO: 8.58 THOUSAND/UL (ref 4.31–10.16)

## 2023-12-17 PROCEDURE — 87177 OVA AND PARASITES SMEARS: CPT | Performed by: EMERGENCY MEDICINE

## 2023-12-17 PROCEDURE — 87209 SMEAR COMPLEX STAIN: CPT | Performed by: EMERGENCY MEDICINE

## 2023-12-17 PROCEDURE — 99284 EMERGENCY DEPT VISIT MOD MDM: CPT | Performed by: EMERGENCY MEDICINE

## 2023-12-17 PROCEDURE — 99284 EMERGENCY DEPT VISIT MOD MDM: CPT

## 2023-12-17 PROCEDURE — 80053 COMPREHEN METABOLIC PANEL: CPT | Performed by: EMERGENCY MEDICINE

## 2023-12-17 PROCEDURE — 36415 COLL VENOUS BLD VENIPUNCTURE: CPT | Performed by: EMERGENCY MEDICINE

## 2023-12-17 PROCEDURE — 85025 COMPLETE CBC W/AUTO DIFF WBC: CPT | Performed by: EMERGENCY MEDICINE

## 2023-12-17 PROCEDURE — 87505 NFCT AGENT DETECTION GI: CPT | Performed by: EMERGENCY MEDICINE

## 2023-12-17 RX ORDER — METRONIDAZOLE 500 MG/1
500 TABLET ORAL EVERY 12 HOURS SCHEDULED
Qty: 12 TABLET | Refills: 0 | Status: SHIPPED | OUTPATIENT
Start: 2023-12-17 | End: 2023-12-23

## 2023-12-17 RX ORDER — METRONIDAZOLE 500 MG/1
500 TABLET ORAL ONCE
Status: COMPLETED | OUTPATIENT
Start: 2023-12-17 | End: 2023-12-17

## 2023-12-17 RX ADMIN — METRONIDAZOLE 500 MG: 500 TABLET ORAL at 13:59

## 2023-12-17 RX ADMIN — SODIUM CHLORIDE 1000 ML: 0.9 INJECTION, SOLUTION INTRAVENOUS at 13:58
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
## 2023-12-17 NOTE — DISCHARGE INSTRUCTIONS
Drink plenty of fluids over the next few days.  Take Flagyl 1 pill twice a day for 6 days.  Finish all antibiotics.    Consider taking a probiotic daily or yogurt daily while on this medication.    Return to the ER for any new, concerning, or worsening issues.

## 2023-12-17 NOTE — ED NOTES
Patient provided a stool sample but it was formed. Patient does report he will have a loose stool, will go again while in ER to provide sample.      Nader Dao RN  12/17/23 9641

## 2023-12-18 LAB
CAMPYLOBACTER DNA SPEC NAA+PROBE: NORMAL
SALMONELLA DNA SPEC QL NAA+PROBE: NORMAL
SHIGA TOXIN STX GENE SPEC NAA+PROBE: NORMAL
SHIGELLA DNA SPEC QL NAA+PROBE: NORMAL

## 2024-02-21 PROBLEM — Z00.00 ENCOUNTER FOR ROUTINE ADULT HEALTH EXAMINATION WITHOUT ABNORMAL FINDINGS: Status: RESOLVED | Noted: 2019-11-01 | Resolved: 2024-02-21

## 2024-06-11 ENCOUNTER — OFFICE VISIT (OUTPATIENT)
Dept: URGENT CARE | Facility: MEDICAL CENTER | Age: 29
End: 2024-06-11
Payer: COMMERCIAL

## 2024-06-11 ENCOUNTER — APPOINTMENT (OUTPATIENT)
Dept: RADIOLOGY | Facility: MEDICAL CENTER | Age: 29
End: 2024-06-11
Payer: COMMERCIAL

## 2024-06-11 VITALS
SYSTOLIC BLOOD PRESSURE: 150 MMHG | TEMPERATURE: 98.1 F | RESPIRATION RATE: 18 BRPM | BODY MASS INDEX: 37.1 KG/M2 | HEART RATE: 72 BPM | DIASTOLIC BLOOD PRESSURE: 88 MMHG | WEIGHT: 266 LBS | OXYGEN SATURATION: 96 %

## 2024-06-11 DIAGNOSIS — H00.011 HORDEOLUM EXTERNUM OF RIGHT UPPER EYELID: ICD-10-CM

## 2024-06-11 DIAGNOSIS — M77.12 LATERAL EPICONDYLITIS OF LEFT ELBOW: Primary | ICD-10-CM

## 2024-06-11 DIAGNOSIS — S46.912A STRAIN OF LEFT ELBOW, INITIAL ENCOUNTER: ICD-10-CM

## 2024-06-11 PROCEDURE — S9083 URGENT CARE CENTER GLOBAL: HCPCS | Performed by: PHYSICIAN ASSISTANT

## 2024-06-11 PROCEDURE — 73080 X-RAY EXAM OF ELBOW: CPT

## 2024-06-11 PROCEDURE — G0382 LEV 3 HOSP TYPE B ED VISIT: HCPCS | Performed by: PHYSICIAN ASSISTANT

## 2024-06-11 RX ORDER — OFLOXACIN 3 MG/ML
1 SOLUTION/ DROPS OPHTHALMIC 4 TIMES DAILY
Qty: 5 ML | Refills: 0 | Status: SHIPPED | OUTPATIENT
Start: 2024-06-11 | End: 2024-06-18

## 2024-06-11 RX ORDER — DULOXETIN HYDROCHLORIDE 30 MG/1
CAPSULE, DELAYED RELEASE ORAL
COMMUNITY
Start: 2024-05-28

## 2024-06-11 RX ORDER — METHYLPREDNISOLONE 4 MG/1
TABLET ORAL
Qty: 21 TABLET | Refills: 0 | Status: SHIPPED | OUTPATIENT
Start: 2024-06-11

## 2024-06-11 RX ORDER — LISDEXAMFETAMINE DIMESYLATE 70 MG/1
70 CAPSULE ORAL EVERY MORNING
COMMUNITY

## 2024-06-11 RX ORDER — BUPROPION HYDROCHLORIDE 300 MG/1
300 TABLET ORAL DAILY
COMMUNITY

## 2024-06-11 NOTE — PROGRESS NOTES
[facility]        NAME: Maynor Cash is a 28 y.o. male  : 1995    MRN: [id number]  DATE: 2024  TIME: 10:42 AM    Assessment and Plan   Lateral epicondylitis of left elbow [M77.12]  1. Lateral epicondylitis of left elbow        2. Strain of left elbow, initial encounter  XR elbow 3+ vw left      3. Hordeolum externum of right upper eyelid              Patient Instructions     Lateral epicondylitis left elbow  Medrol Dosepak as directed  Stye right eye  Ocuflox as directed  Warm compresses to affected area  Follow up with PCP in 3-5 days.  Proceed to  ER if symptoms worsen.    Chief Complaint     Chief Complaint   Patient presents with    Eye Pain     Pt. With throbbing outside  his right eye that began a few days ago.     Elbow Pain     Pt. With left elbow pain that began after lifting something heavy repetitively. The pain began two months ago. States pain radiates down his arm and hid hand becomes numb at night.          History of Present Illness       28-year-old male who presents complaining of having a bump on the right eyelid x 4 days.  Denies any eye pain, discharge, foreign body, trauma, visual disturbances.  Patient also complains of left elbow pain after lifting heavy stuff repetitively at home.  States that he has been using over-the-counter medication for pain control with no relief.  Denies fevers, chills, fall.    Eye Pain     Elbow Pain  Associated symptoms include arthralgias.       Review of Systems   Review of Systems   Eyes:  Positive for pain.   Musculoskeletal:  Positive for arthralgias.         Current Medications       Current Outpatient Medications:     buPROPion (WELLBUTRIN XL) 300 mg 24 hr tablet, Take 300 mg by mouth daily, Disp: , Rfl:     DULoxetine (CYMBALTA) 30 mg delayed release capsule, TAKE 1 CAPSULE BY MOUTH ONCE DAILY ADDED TO 60MG (TOTAL DAILY DOSE 90MG), Disp: , Rfl:     Vyvanse 70 MG capsule, Take 70 mg by mouth every morning, Disp: , Rfl:      amphetamine-dextroamphetamine (ADDERALL XR) 30 MG 24 hr capsule, , Disp: , Rfl: 0    ARIPiprazole (ABILIFY) 10 mg tablet, Take 1 tablet (10 mg total) by mouth daily, Disp: 30 tablet, Rfl: 0    cholecalciferol (VITAMIN D3) 400 units tablet, Take 2 tablets (800 Units total) by mouth daily, Disp: 60 tablet, Rfl: 0    Current Allergies     Allergies as of 06/11/2024    (No Known Allergies)            The following portions of the patient's history were reviewed and updated as appropriate: allergies, current medications, past family history, past medical history, past social history, past surgical history and problem list.     Past Medical History:   Diagnosis Date    Bipolar 1 disorder (HCC)     Depression        Past Surgical History:   Procedure Laterality Date    SHOULDER ARTHROSCOPY      labrum tear, b/l       No family history on file.      Medications have been verified.        Objective   /88   Pulse 72   Temp 98.1 °F (36.7 °C)   Resp 18   Wt 121 kg (266 lb)   SpO2 96%   BMI 37.10 kg/m²        Physical Exam     Physical Exam  Constitutional:       General: He is not in acute distress.     Appearance: Normal appearance. He is well-developed. He is not diaphoretic.   HENT:      Head: Normocephalic and atraumatic.   Eyes:      General: Vision grossly intact. Gaze aligned appropriately. No allergic shiner, visual field deficit or scleral icterus.        Right eye: Hordeolum present. No foreign body or discharge.         Left eye: No foreign body, discharge or hordeolum.      Extraocular Movements: Extraocular movements intact.      Conjunctiva/sclera: Conjunctivae normal.   Cardiovascular:      Rate and Rhythm: Normal rate and regular rhythm.      Heart sounds: Normal heart sounds.   Pulmonary:      Effort: Pulmonary effort is normal. No respiratory distress.      Breath sounds: Normal breath sounds. No wheezing or rales.   Chest:      Chest wall: No tenderness.   Musculoskeletal:        Arms:        Cervical back: Normal range of motion and neck supple.   Lymphadenopathy:      Cervical: No cervical adenopathy.   Neurological:      Mental Status: He is alert.

## 2024-06-11 NOTE — PATIENT INSTRUCTIONS
Lateral epicondylitis left elbow  Medrol Dosepak as directed  Stye right eye  Ocuflox as directed  Warm compresses to affected area  Follow up with PCP in 3-5 days.  Proceed to  ER if symptoms worsen.

## 2024-12-29 ENCOUNTER — OFFICE VISIT (OUTPATIENT)
Dept: URGENT CARE | Facility: MEDICAL CENTER | Age: 29
End: 2024-12-29
Payer: COMMERCIAL

## 2024-12-29 VITALS
RESPIRATION RATE: 20 BRPM | SYSTOLIC BLOOD PRESSURE: 138 MMHG | DIASTOLIC BLOOD PRESSURE: 88 MMHG | OXYGEN SATURATION: 98 % | TEMPERATURE: 98.9 F | HEART RATE: 90 BPM

## 2024-12-29 DIAGNOSIS — J01.00 ACUTE MAXILLARY SINUSITIS, RECURRENCE NOT SPECIFIED: Primary | ICD-10-CM

## 2024-12-29 PROCEDURE — 99213 OFFICE O/P EST LOW 20 MIN: CPT | Performed by: PHYSICIAN ASSISTANT

## 2024-12-29 PROCEDURE — S9083 URGENT CARE CENTER GLOBAL: HCPCS | Performed by: PHYSICIAN ASSISTANT

## 2024-12-30 NOTE — PROGRESS NOTES
[facility]        NAME: Maynor Cash is a 29 y.o. male  : 1995    MRN: [id number]  DATE: 2024  TIME: 12:19 PM    Assessment and Plan   Acute maxillary sinusitis, recurrence not specified [J01.00]  1. Acute maxillary sinusitis, recurrence not specified  amoxicillin-clavulanate (AUGMENTIN) 875-125 mg per tablet            Patient Instructions       Follow up with PCP in 3-5 days.  Proceed to  ER if symptoms worsen.    If tests have been performed at [facility], our office will contact you with results if changes need to be made to the care plan discussed with you at the visit.  [facility]    Chief Complaint     Chief Complaint   Patient presents with    Weakness - Generalized    Nasal Congestion     Nasal congestion, headache, cough, fever, generalized weakness; ongoing for 5 days     Fatigue    Depression     Patient states he was broken up a week before estelle and has had severe depression, decreased appetite, feelings of hopelessness, and states that he would be better off dead; denies active suicidal thoughts during triage- Depression screening performed during triage and Christian Alfonso made aware of situation immediately.          History of Present Illness       Patient here for evaluation of nasal congestion, headache, sinus pressure, weakness, fatigue.    Fatigue  Associated symptoms include congestion, fatigue, a fever and headaches. Pertinent negatives include no chills, diaphoresis, numbness, sore throat or weakness.   Depression  Associated symptoms include congestion, fatigue, a fever and headaches. Pertinent negatives include no chills, diaphoresis, numbness, sore throat or weakness.       Review of Systems   Review of Systems   Constitutional:  Positive for fatigue and fever. Negative for activity change, appetite change, chills and diaphoresis.   HENT:  Positive for congestion, postnasal drip and sinus pressure. Negative  for ear discharge, ear pain, facial swelling, rhinorrhea, sinus pain and sore throat.    Eyes: Negative.    Respiratory: Negative.     Cardiovascular: Negative.    Gastrointestinal: Negative.    Musculoskeletal: Negative.    Skin: Negative.    Neurological:  Positive for headaches. Negative for dizziness, tremors, seizures, syncope, facial asymmetry, speech difficulty, weakness, light-headedness and numbness.   Psychiatric/Behavioral:  Positive for depression. Negative for agitation, decreased concentration, self-injury, sleep disturbance and suicidal ideas. The patient is not nervous/anxious.          Current Medications       Current Outpatient Medications:     amoxicillin-clavulanate (AUGMENTIN) 875-125 mg per tablet, Take 1 tablet by mouth every 12 (twelve) hours for 7 days, Disp: 14 tablet, Rfl: 0    buPROPion (WELLBUTRIN XL) 300 mg 24 hr tablet, Take 300 mg by mouth daily, Disp: , Rfl:     DULoxetine (CYMBALTA) 30 mg delayed release capsule, TAKE 1 CAPSULE BY MOUTH ONCE DAILY ADDED TO 60MG (TOTAL DAILY DOSE 90MG), Disp: , Rfl:     Vyvanse 70 MG capsule, Take 70 mg by mouth every morning, Disp: , Rfl:     amphetamine-dextroamphetamine (ADDERALL XR) 30 MG 24 hr capsule, , Disp: , Rfl: 0    ARIPiprazole (ABILIFY) 10 mg tablet, Take 1 tablet (10 mg total) by mouth daily (Patient not taking: Reported on 12/29/2024), Disp: 30 tablet, Rfl: 0    cholecalciferol (VITAMIN D3) 400 units tablet, Take 2 tablets (800 Units total) by mouth daily (Patient not taking: Reported on 12/29/2024), Disp: 60 tablet, Rfl: 0    methylPREDNISolone 4 MG tablet therapy pack, Use as directed on package (Patient not taking: Reported on 12/29/2024), Disp: 21 tablet, Rfl: 0    Current Allergies     Allergies as of 12/29/2024    (No Known Allergies)            The following portions of the patient's history were reviewed and updated as appropriate: allergies, current medications, past family history, past medical history, past social history,  past surgical history and problem list.     Past Medical History:   Diagnosis Date    Bipolar 1 disorder (HCC)     Depression        Past Surgical History:   Procedure Laterality Date    SHOULDER ARTHROSCOPY      labrum tear, b/l       History reviewed. No pertinent family history.      Medications have been verified.        Objective   /88   Pulse 90   Temp 98.9 °F (37.2 °C) (Temporal)   Resp 20   SpO2 98%   No LMP for male patient.       Physical Exam     Physical Exam  Vitals and nursing note reviewed.   Constitutional:       General: He is not in acute distress.     Appearance: Normal appearance. He is well-developed. He is not ill-appearing, toxic-appearing or diaphoretic.   HENT:      Head: Normocephalic and atraumatic.      Right Ear: Tympanic membrane and ear canal normal.      Left Ear: Tympanic membrane and ear canal normal.      Nose: Congestion present.      Comments: Bilateral nasal congestion and erythema with mucopurulent drainage.  Bilateral maxillary sinus tenderness.     Mouth/Throat:      Mouth: Mucous membranes are moist.      Pharynx: No oropharyngeal exudate or posterior oropharyngeal erythema.   Eyes:      General:         Right eye: No discharge.         Left eye: No discharge.      Extraocular Movements: Extraocular movements intact.      Conjunctiva/sclera: Conjunctivae normal.      Pupils: Pupils are equal, round, and reactive to light.   Cardiovascular:      Rate and Rhythm: Normal rate and regular rhythm.      Heart sounds: Normal heart sounds. No murmur heard.  Pulmonary:      Effort: Pulmonary effort is normal. No respiratory distress.      Breath sounds: Normal breath sounds. No stridor. No wheezing, rhonchi or rales.   Lymphadenopathy:      Cervical: Cervical adenopathy present.   Skin:     General: Skin is warm and dry.   Neurological:      General: No focal deficit present.      Mental Status: He is alert and oriented to person, place, and time.   Psychiatric:          Mood and Affect: Mood normal.         Behavior: Behavior normal.         Thought Content: Thought content normal.         Judgment: Judgment normal.

## 2025-06-18 ENCOUNTER — HOSPITAL ENCOUNTER (EMERGENCY)
Facility: HOSPITAL | Age: 30
Discharge: HOME | End: 2025-06-18
Payer: COMMERCIAL

## 2025-06-18 ENCOUNTER — APPOINTMENT (EMERGENCY)
Dept: CT IMAGING | Facility: HOSPITAL | Age: 30
End: 2025-06-18
Payer: COMMERCIAL

## 2025-06-18 VITALS
TEMPERATURE: 98 F | DIASTOLIC BLOOD PRESSURE: 68 MMHG | BODY MASS INDEX: 30.66 KG/M2 | SYSTOLIC BLOOD PRESSURE: 131 MMHG | RESPIRATION RATE: 18 BRPM | OXYGEN SATURATION: 99 % | HEART RATE: 81 BPM | WEIGHT: 219.8 LBS

## 2025-06-18 DIAGNOSIS — R10.9 ABDOMINAL PAIN: Primary | ICD-10-CM

## 2025-06-18 LAB
ALBUMIN SERPL BCG-MCNC: 4.4 G/DL (ref 3.5–5)
ALP SERPL-CCNC: 56 U/L (ref 34–104)
ALT SERPL W P-5'-P-CCNC: 30 U/L (ref 7–52)
ANION GAP SERPL CALCULATED.3IONS-SCNC: 5 MMOL/L (ref 4–13)
AST SERPL W P-5'-P-CCNC: 26 U/L (ref 13–39)
BASOPHILS # BLD AUTO: 0.02 THOUSANDS/ÂΜL (ref 0–0.1)
BASOPHILS NFR BLD AUTO: 0 % (ref 0–1)
BILIRUB SERPL-MCNC: 0.4 MG/DL (ref 0.2–1)
BUN SERPL-MCNC: 20 MG/DL (ref 5–25)
CALCIUM SERPL-MCNC: 10.1 MG/DL (ref 8.4–10.2)
CHLORIDE SERPL-SCNC: 103 MMOL/L (ref 96–108)
CO2 SERPL-SCNC: 29 MMOL/L (ref 21–32)
CREAT SERPL-MCNC: 0.95 MG/DL (ref 0.6–1.3)
EOSINOPHIL # BLD AUTO: 0.16 THOUSAND/ÂΜL (ref 0–0.61)
EOSINOPHIL NFR BLD AUTO: 2 % (ref 0–6)
ERYTHROCYTE [DISTWIDTH] IN BLOOD BY AUTOMATED COUNT: 12.6 % (ref 11.6–15.1)
GFR SERPL CREATININE-BSD FRML MDRD: 108 ML/MIN/1.73SQ M
GLUCOSE SERPL-MCNC: 91 MG/DL (ref 65–140)
HCT VFR BLD AUTO: 41.1 % (ref 36.5–49.3)
HGB BLD-MCNC: 13.4 G/DL (ref 12–17)
IMM GRANULOCYTES # BLD AUTO: 0.01 THOUSAND/UL (ref 0–0.2)
IMM GRANULOCYTES NFR BLD AUTO: 0 % (ref 0–2)
LACTATE SERPL-SCNC: 0.5 MMOL/L (ref 0.5–2)
LIPASE SERPL-CCNC: 19 U/L (ref 11–82)
LYMPHOCYTES # BLD AUTO: 1.73 THOUSANDS/ÂΜL (ref 0.6–4.47)
LYMPHOCYTES NFR BLD AUTO: 26 % (ref 14–44)
MCH RBC QN AUTO: 28.2 PG (ref 26.8–34.3)
MCHC RBC AUTO-ENTMCNC: 32.6 G/DL (ref 31.4–37.4)
MCV RBC AUTO: 87 FL (ref 82–98)
MONOCYTES # BLD AUTO: 0.43 THOUSAND/ÂΜL (ref 0.17–1.22)
MONOCYTES NFR BLD AUTO: 7 % (ref 4–12)
NEUTROPHILS # BLD AUTO: 4.31 THOUSANDS/ÂΜL (ref 1.85–7.62)
NEUTS SEG NFR BLD AUTO: 65 % (ref 43–75)
NRBC BLD AUTO-RTO: 0 /100 WBCS
PLATELET # BLD AUTO: 205 THOUSANDS/UL (ref 149–390)
PMV BLD AUTO: 8.9 FL (ref 8.9–12.7)
POTASSIUM SERPL-SCNC: 4.2 MMOL/L (ref 3.5–5.3)
PROT SERPL-MCNC: 7.1 G/DL (ref 6.4–8.4)
RBC # BLD AUTO: 4.75 MILLION/UL (ref 3.88–5.62)
SODIUM SERPL-SCNC: 137 MMOL/L (ref 135–147)
WBC # BLD AUTO: 6.66 THOUSAND/UL (ref 4.31–10.16)

## 2025-06-18 PROCEDURE — 96375 TX/PRO/DX INJ NEW DRUG ADDON: CPT

## 2025-06-18 PROCEDURE — 80053 COMPREHEN METABOLIC PANEL: CPT

## 2025-06-18 PROCEDURE — 96365 THER/PROPH/DIAG IV INF INIT: CPT

## 2025-06-18 PROCEDURE — 99284 EMERGENCY DEPT VISIT MOD MDM: CPT

## 2025-06-18 PROCEDURE — 83605 ASSAY OF LACTIC ACID: CPT

## 2025-06-18 PROCEDURE — 83690 ASSAY OF LIPASE: CPT

## 2025-06-18 PROCEDURE — 36415 COLL VENOUS BLD VENIPUNCTURE: CPT

## 2025-06-18 PROCEDURE — 85025 COMPLETE CBC W/AUTO DIFF WBC: CPT

## 2025-06-18 RX ORDER — PANTOPRAZOLE SODIUM 20 MG/1
20 TABLET, DELAYED RELEASE ORAL DAILY
Qty: 30 TABLET | Refills: 0 | Status: SHIPPED | OUTPATIENT
Start: 2025-06-18

## 2025-06-18 RX ORDER — MAGNESIUM HYDROXIDE/ALUMINUM HYDROXICE/SIMETHICONE 120; 1200; 1200 MG/30ML; MG/30ML; MG/30ML
30 SUSPENSION ORAL ONCE
Status: COMPLETED | OUTPATIENT
Start: 2025-06-18 | End: 2025-06-18

## 2025-06-18 RX ORDER — SUCRALFATE 1 G/1
1 TABLET ORAL ONCE
Status: COMPLETED | OUTPATIENT
Start: 2025-06-18 | End: 2025-06-18

## 2025-06-18 RX ORDER — SODIUM CHLORIDE, SODIUM GLUCONATE, SODIUM ACETATE, POTASSIUM CHLORIDE, MAGNESIUM CHLORIDE, SODIUM PHOSPHATE, DIBASIC, AND POTASSIUM PHOSPHATE .53; .5; .37; .037; .03; .012; .00082 G/100ML; G/100ML; G/100ML; G/100ML; G/100ML; G/100ML; G/100ML
1000 INJECTION, SOLUTION INTRAVENOUS ONCE
Status: COMPLETED | OUTPATIENT
Start: 2025-06-18 | End: 2025-06-18

## 2025-06-18 RX ORDER — KETOROLAC TROMETHAMINE 30 MG/ML
15 INJECTION, SOLUTION INTRAMUSCULAR; INTRAVENOUS ONCE
Status: COMPLETED | OUTPATIENT
Start: 2025-06-18 | End: 2025-06-18

## 2025-06-18 RX ORDER — PANTOPRAZOLE SODIUM 40 MG/10ML
40 INJECTION, POWDER, LYOPHILIZED, FOR SOLUTION INTRAVENOUS ONCE
Status: COMPLETED | OUTPATIENT
Start: 2025-06-18 | End: 2025-06-18

## 2025-06-18 RX ADMIN — KETOROLAC TROMETHAMINE 15 MG: 30 INJECTION, SOLUTION INTRAMUSCULAR at 16:55

## 2025-06-18 RX ADMIN — SUCRALFATE 1 G: 1 TABLET ORAL at 16:55

## 2025-06-18 RX ADMIN — SODIUM CHLORIDE, SODIUM GLUCONATE, SODIUM ACETATE, POTASSIUM CHLORIDE, MAGNESIUM CHLORIDE, SODIUM PHOSPHATE, DIBASIC, AND POTASSIUM PHOSPHATE 1000 ML: .53; .5; .37; .037; .03; .012; .00082 INJECTION, SOLUTION INTRAVENOUS at 16:54

## 2025-06-18 RX ADMIN — ALUMINUM HYDROXIDE, MAGNESIUM HYDROXIDE, AND DIMETHICONE 30 ML: 200; 20; 200 SUSPENSION ORAL at 16:55

## 2025-06-18 RX ADMIN — PANTOPRAZOLE SODIUM 40 MG: 40 INJECTION, POWDER, FOR SOLUTION INTRAVENOUS at 16:56

## 2025-06-18 NOTE — ED PROVIDER NOTES
"Time reflects when diagnosis was documented in both MDM as applicable and the Disposition within this note       Time User Action Codes Description Comment    6/18/2025  7:05 PM Lily Martin Add [R10.9] Abdominal pain           ED Disposition       ED Disposition   Discharge    Condition   Stable    Date/Time   Wed Jun 18, 2025  7:05 PM    Comment   Maynor GARCIA Shailesh discharge to home/self care.                   Assessment & Plan       Medical Decision Making  29-year-old male presents for evaluation of abdominal discomfort    Recent negative abdominal work-up    Ddx includes but is not limited to pancreatitis, SBO, colitis, diverticulitis, appendicitis though these are highly unlikely given pt just received a negative CT within the last 24h and symptoms have not changed.    Plan: bloodwork, imaging    Patient blood work is unrevealing.  VSS, sitting comfortably on stretcher in NAD. Notably patient had negative abdominal scan within the last 24 hours and states there is no worsening of his abdominal pain, cites lack of information as reason for presentation. Blood work in the ED is unrevealing. Relayed to patient that I can obtain repeat abdominal scan however patient already received dye load and there is no change in his symptomatology. Discussed risks and benefits. Pt states he doesn't want to stay longer for his CT because he has already been here four hours and would like to go home, I relayed that I will not be able to diagnose cause of abdominal pain. Recommended PCP and GI follow-up, pt responds with \"I'm done with doctors for awhile\" Overall examination benign, reassuring that pt negative at OSH within last 24h though given pt does not want to stay for CT imaging cannot rule out any life-threatening pathology. Return precautions given to pt, pt d/c home in good condition. Pt left prior to giving urine sample.    Amount and/or Complexity of Data Reviewed  Labs: ordered.  Radiology: " ordered.    Risk  OTC drugs.  Prescription drug management.        ED Course as of 06/21/25 1906 Wed Jun 18, 2025 1904 After shared decision making with patient, he states he does not desire to wait any longer for a CAT scan and would like to leave.  Recommend GI follow-up and PCP follow-up.   1911 Relayed to patient cannot evaluate further without abdominal imaging, he verbalized understanding       Medications   pantoprazole (PROTONIX) injection 40 mg (40 mg Intravenous Given 6/18/25 1656)   sucralfate (CARAFATE) tablet 1 g (1 g Oral Given 6/18/25 1655)   aluminum-magnesium hydroxide-simethicone (MAALOX) oral suspension 30 mL (30 mL Oral Given 6/18/25 1655)   ketorolac (TORADOL) injection 15 mg (15 mg Intravenous Given 6/18/25 1655)   multi-electrolyte (Plasmalyte-A/Isolyte-S PH 7.4/Normosol-R) IV bolus 1,000 mL (0 mL Intravenous Stopped 6/18/25 1754)       ED Risk Strat Scores                    No data recorded        SBIRT 20yo+      Flowsheet Row Most Recent Value   Initial Alcohol Screen: US AUDIT-C     1. How often do you have a drink containing alcohol? 0 Filed at: 06/18/2025 1907   2. How many drinks containing alcohol do you have on a typical day you are drinking?  0 Filed at: 06/18/2025 1907   3a. Male UNDER 65: How often do you have five or more drinks on one occasion? 0 Filed at: 06/18/2025 1907   Audit-C Score 0 Filed at: 06/18/2025 1907   CORTES: How many times in the past year have you...    Used an illegal drug or used a prescription medication for non-medical reasons? Never Filed at: 06/18/2025 1907                            History of Present Illness       Chief Complaint   Patient presents with    Abdominal Pain     Pt presents to the ER co abd pain and diarrhea. Pt was at [facility] yesterday for the same thing. Pt states that there is a bulge near the umbilical area that is painful to the touch. No NV       Past Medical History[1]   Past Surgical History[2]   Family History[3]   Social  "History[4]   E-Cigarette/Vaping    E-Cigarette Use Current Every Day User       E-Cigarette/Vaping Substances    Nicotine Yes     THC No     CBD No     Flavoring Yes       I have reviewed and agree with the history as documented.     Patient is a 21-year-old male with past medical history significant for bipolar, depression presenting emergency department for evaluation abdominal pain.  Patient reports he has had ongoing abdominal pain for 2 to 3 weeks.  When asked to describe the character of his pain he states it is \"just the pain \".  Patient was seen at outside hospital within the last 24 hours with a negative workup including CBC, CMP, lactate, lipase, UA, CT abdomen pelvis.  CT abdomen pelvis was overwhelmingly negative.  Patient reports frustration because he was not given an answer as to why he has abdominal pain and now presents asking for an answer.  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  Denies taking any medication to help his abdominal pain improved.  Denies fevers, chills, nausea, vomiting, diarrhea, rash, testicular or scrotal pain, penile discharge, penile sores.  Notes intermittent dysuria, has been on doxycycline for the same.  Denies surgical history.         Review of Systems   Constitutional:  Negative for chills and fever.   HENT:  Negative for ear pain and sore throat.    Eyes:  Negative for pain and visual disturbance.   Respiratory:  Negative for cough and shortness of breath.    Cardiovascular:  Negative for chest pain and palpitations.   Gastrointestinal:  Negative for abdominal pain and vomiting.   Genitourinary:  Negative for dysuria and hematuria.   Musculoskeletal:  Negative for arthralgias and back pain.   Skin:  Negative for color change and rash.   Neurological:  Negative for seizures and syncope.   All other systems reviewed and are negative.          Objective       ED Triage Vitals [06/18/25 1435]   Temperature Pulse Blood Pressure Respirations SpO2 Patient Position - " Orthostatic VS   98 °F (36.7 °C) 81 131/68 18 99 % Sitting      Temp Source Heart Rate Source BP Location FiO2 (%) Pain Score    Oral Monitor Left arm -- --      Vitals      Date and Time Temp Pulse SpO2 Resp BP Pain Score FACES Pain Rating User   06/18/25 1435 98 °F (36.7 °C) 81 99 % 18 131/68 -- -- TE            Physical Exam  Vitals and nursing note reviewed.   Constitutional:       General: He is not in acute distress.     Appearance: He is well-developed.      Comments: Well-appearing male sitting in NAD   HENT:      Head: Normocephalic and atraumatic.     Eyes:      Conjunctiva/sclera: Conjunctivae normal.       Cardiovascular:      Rate and Rhythm: Normal rate and regular rhythm.   Pulmonary:      Effort: Pulmonary effort is normal. No respiratory distress.      Breath sounds: Normal breath sounds.   Abdominal:      Palpations: Abdomen is soft.      Tenderness: There is no abdominal tenderness.      Comments: Reassuring abdominal exam, no distention, rigidity, guarding.  Patient points to his umbilicus and states this is the area of concern.  No clear distention or umbilical hernia.  Minimal discomfort with deep palpation of umbilicus.     Musculoskeletal:         General: No swelling.      Cervical back: Neck supple.     Skin:     General: Skin is warm and dry.      Capillary Refill: Capillary refill takes less than 2 seconds.     Neurological:      Mental Status: He is alert.     Psychiatric:         Mood and Affect: Mood normal.         Results Reviewed       Procedure Component Value Units Date/Time    Comprehensive metabolic panel [[id number]] Collected: 06/18/25 1652    Lab Status: Final result Specimen: Blood from Arm, Right Updated: 06/18/25 3097     Sodium 137 mmol/L      Potassium 4.2 mmol/L      Chloride 103 mmol/L      CO2 29 mmol/L      ANION GAP 5 mmol/L      BUN 20 mg/dL      Creatinine 0.95 mg/dL      Glucose 91 mg/dL      Calcium 10.1 mg/dL      AST 26 U/L      ALT 30 U/L      Alkaline  Phosphatase 56 U/L      Total Protein 7.1 g/dL      Albumin 4.4 g/dL      Total Bilirubin 0.40 mg/dL      eGFR 108 ml/min/1.73sq m     Narrative:      National Kid[employer] Disease Foundation guidelines for Chronic Kidney Disease (CKD):     Stage 1 with normal or high GFR (GFR > 90 mL/min/1.73 square meters)    Stage 2 Mild CKD (GFR = 60-89 mL/min/1.73 square meters)    Stage 3A Moderate CKD (GFR = 45-59 mL/min/1.73 square meters)    Stage 3B Moderate CKD (GFR = 30-44 mL/min/1.73 square meters)    Stage 4 Severe CKD (GFR = 15-29 mL/min/1.73 square meters)    Stage 5 End Stage CKD (GFR <15 mL/min/1.73 square meters)  Note: GFR calculation is accurate only with a steady state creatinine    Lipase [[id number]]  (Normal) Collected: 06/18/25 1652    Lab Status: Final result Specimen: Blood from Arm, Right Updated: 06/18/25 1713     Lipase 19 u/L     Lactic acid, plasma (w/reflex if result > 2.0) [[id number]]  (Normal) Collected: 06/18/25 1652    Lab Status: Final result Specimen: Blood from Arm, Right Updated: 06/18/25 1712     LACTIC ACID 0.5 mmol/L     Narrative:      Result may be elevated if tourniquet was used during collection.    CBC and differential [[id number]] Collected: 06/18/25 1652    Lab Status: Final result Specimen: Blood from Arm, Right Updated: 06/18/25 1658     WBC 6.66 Thousand/uL      RBC 4.75 Million/uL      Hemoglobin 13.4 g/dL      Hematocrit 41.1 %      MCV 87 fL      MCH 28.2 pg      MCHC 32.6 g/dL      RDW 12.6 %      MPV 8.9 fL      Platelets 205 Thousands/uL      nRBC 0 /100 WBCs      Segmented % 65 %      Immature Grans % 0 %      Lymphocytes % 26 %      Monocytes % 7 %      Eosinophils Relative 2 %      Basophils Relative 0 %      Absolute Neutrophils 4.31 Thousands/µL      Absolute Immature Grans 0.01 Thousand/uL      Absolute Lymphocytes 1.73 Thousands/µL      Absolute Monocytes 0.43 Thousand/µL      Eosinophils Absolute 0.16 Thousand/µL      Basophils Absolute 0.02 Thousands/µL             No  orders to display       Procedures    ED Medication and Procedure Management   Prior to Admission Medications   Prescriptions Last Dose Informant Patient Reported? Taking?   ARIPiprazole (ABILIFY) 10 mg tablet   No No   Sig: Take 1 tablet (10 mg total) by mouth daily   Patient not taking: Reported on 12/29/2024   DULoxetine (CYMBALTA) 30 mg delayed release capsule   Yes No   Sig: TAKE 1 CAPSULE BY MOUTH ONCE DAILY ADDED TO 60MG (TOTAL DAILY DOSE 90MG)   Vyvanse 70 MG capsule   Yes No   Sig: Take 70 mg by mouth every morning   amphetamine-dextroamphetamine (ADDERALL XR) 30 MG 24 hr capsule   Yes No   Patient not taking: Reported on 12/29/2024   buPROPion (WELLBUTRIN XL) 300 mg 24 hr tablet   Yes No   Sig: Take 300 mg by mouth daily   cholecalciferol (VITAMIN D3) 400 units tablet   No No   Sig: Take 2 tablets (800 Units total) by mouth daily   Patient not taking: Reported on 12/29/2024   methylPREDNISolone 4 MG tablet therapy pack   No No   Sig: Use as directed on package   Patient not taking: Reported on 12/29/2024      Facility-Administered Medications: None     Discharge Medication List as of 6/18/2025  7:13 PM        START taking these medications    Details   pantoprazole (PROTONIX) 20 mg tablet Take 1 tablet (20 mg total) by mouth daily, Starting Wed 6/18/2025, Normal           CONTINUE these medications which have NOT CHANGED    Details   amphetamine-dextroamphetamine (ADDERALL XR) 30 MG 24 hr capsule Starting Thu 8/22/2019, Historical Med      ARIPiprazole (ABILIFY) 10 mg tablet Take 1 tablet (10 mg total) by mouth daily, Starting Tue 9/28/2021, Until Thu 10/28/2021, Normal      buPROPion (WELLBUTRIN XL) 300 mg 24 hr tablet Take 300 mg by mouth daily, Historical Med      cholecalciferol (VITAMIN D3) 400 units tablet Take 2 tablets (800 Units total) by mouth daily, Starting Tue 9/28/2021, Until Thu 10/28/2021, Normal      DULoxetine (CYMBALTA) 30 mg delayed release capsule TAKE 1 CAPSULE BY MOUTH ONCE DAILY  ADDED TO 60MG (TOTAL DAILY DOSE 90MG), Historical Med      methylPREDNISolone 4 MG tablet therapy pack Use as directed on package, Normal      Vyvanse 70 MG capsule Take 70 mg by mouth every morning, Historical Med             ED SEPSIS DOCUMENTATION   Time reflects when diagnosis was documented in both MDM as applicable and the Disposition within this note       Time User Action Codes Description Comment    6/18/2025  7:05 PM Lily Martin Add [R10.9] Abdominal pain                      [1]   Past Medical History:  Diagnosis Date    Bipolar 1 disorder (HCC)     Depression    [2]   Past Surgical History:  Procedure Laterality Date    SHOULDER ARTHROSCOPY      labrum tear, b/l   [3] No family history on file.  [4]   Social History  Tobacco Use    Smoking status: Former     Types: Cigarettes    Smokeless tobacco: Former   Vaping Use    Vaping status: Every Day    Substances: Nicotine, Flavoring   Substance Use Topics    Alcohol use: Yes     Comment: social    Drug use: Not Currently     Types: Marijuana        Lily Martin DO  06/21/25 1906

## 2025-06-18 NOTE — DISCHARGE INSTRUCTIONS
Follow-up with the belly doctor.  Follow-up with your primary care physician.  Return to the ER for new or worsening symptoms.